# Patient Record
Sex: FEMALE | Race: WHITE | NOT HISPANIC OR LATINO | Employment: OTHER | URBAN - METROPOLITAN AREA
[De-identification: names, ages, dates, MRNs, and addresses within clinical notes are randomized per-mention and may not be internally consistent; named-entity substitution may affect disease eponyms.]

---

## 2017-03-28 DIAGNOSIS — Z12.31 ENCOUNTER FOR SCREENING MAMMOGRAM FOR MALIGNANT NEOPLASM OF BREAST: ICD-10-CM

## 2017-04-01 DIAGNOSIS — M54.50 LOW BACK PAIN: ICD-10-CM

## 2017-04-01 DIAGNOSIS — T45.2X1A: ICD-10-CM

## 2017-04-01 DIAGNOSIS — Z00.00 ENCOUNTER FOR GENERAL ADULT MEDICAL EXAMINATION WITHOUT ABNORMAL FINDINGS: ICD-10-CM

## 2017-04-19 ENCOUNTER — ALLSCRIPTS OFFICE VISIT (OUTPATIENT)
Dept: OTHER | Facility: OTHER | Age: 62
End: 2017-04-19

## 2017-05-02 ENCOUNTER — GENERIC CONVERSION - ENCOUNTER (OUTPATIENT)
Dept: OTHER | Facility: OTHER | Age: 62
End: 2017-05-02

## 2017-05-05 ENCOUNTER — GENERIC CONVERSION - ENCOUNTER (OUTPATIENT)
Dept: OTHER | Facility: OTHER | Age: 62
End: 2017-05-05

## 2017-05-05 LAB — ADDITIONAL INFORMATION (HISTORICAL): NORMAL

## 2017-05-11 ENCOUNTER — GENERIC CONVERSION - ENCOUNTER (OUTPATIENT)
Dept: OTHER | Facility: OTHER | Age: 62
End: 2017-05-11

## 2017-05-11 LAB — ADDITIONAL INFORMATION (HISTORICAL): NORMAL

## 2017-06-26 DIAGNOSIS — T45.2X1A: ICD-10-CM

## 2017-06-26 DIAGNOSIS — Z12.31 ENCOUNTER FOR SCREENING MAMMOGRAM FOR MALIGNANT NEOPLASM OF BREAST: ICD-10-CM

## 2017-06-26 DIAGNOSIS — R10.84 GENERALIZED ABDOMINAL PAIN: ICD-10-CM

## 2017-06-26 DIAGNOSIS — M54.50 LOW BACK PAIN: ICD-10-CM

## 2017-06-26 DIAGNOSIS — Z00.00 ENCOUNTER FOR GENERAL ADULT MEDICAL EXAMINATION WITHOUT ABNORMAL FINDINGS: ICD-10-CM

## 2017-07-24 ENCOUNTER — ALLSCRIPTS OFFICE VISIT (OUTPATIENT)
Dept: OTHER | Facility: OTHER | Age: 62
End: 2017-07-24

## 2017-08-18 ENCOUNTER — GENERIC CONVERSION - ENCOUNTER (OUTPATIENT)
Dept: OTHER | Facility: OTHER | Age: 62
End: 2017-08-18

## 2017-10-02 ENCOUNTER — GENERIC CONVERSION - ENCOUNTER (OUTPATIENT)
Dept: OTHER | Facility: OTHER | Age: 62
End: 2017-10-02

## 2018-01-11 NOTE — PROGRESS NOTES
Assessment    1  Former smoker (V15 82) (F29 525)   2  Vitamin D overdose (963 5,E980 4) (T45 2X1A)   3  Encounter for preventive health examination (V70 0) (Z00 00)    Plan  SocHx: Engages in travel abroad    · Transderm-Scop (1 5 MG) 1 MG/3DAYS Transdermal Patch 72 Hour  Vitamin D overdose    · (1) VITAMIN D 25-HYDROXY; Status:Active; Requested for:19Apr2017;     Discussion/Summary  health maintenance visit Currently, she eats a healthy diet and has an adequate exercise regimen  cervical cancer screening is managed by Gynecology Breast cancer screening: mammogram is current and Completes them at [facility]  Colorectal cancer screening: colorectal cancer screening is current and colorectal cancer screening is managed by Dr Brant Amaral was seen and examined in the office today  Her examination today was largely within normal limits  She is very active and typically runs about 5 miles a day  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  Her recent blood work reveals an excess of Vitamin D and she was told to stop her every other day Vit D supplement  She will repeat the level in about 3-4 months  The patient was counseled regarding instructions for management  Possible side effects of new medications were reviewed with the patient/guardian today  The treatment plan was reviewed with the patient/guardian  The patient/guardian understands and agrees with the treatment plan      Chief Complaint  Patient is here for yearly physical      History of Present Illness  HM, Adult Female: The patient is being seen for a health maintenance evaluation  Social History: Work status: working full time and occupation: Psychologist     [address]: She has regular dental visits  She denies vision problems  Lifestyle:  She consumes a diverse and healthy diet  She does not have any weight concerns  She exercises regularly  She does not use tobacco  She denies alcohol use     Screening: HPI: Dr Chelsea Goyal is here today for a routine physical  Chart was reviewed and updated  She also completed recent blood work which was reviewed as well  She reports generally feeling well and denies any significant complaints  She reports having a trip in [facility] in February and noted some nausea since  Her appetite has not changed  Denies abdominal pain or changes in stool  She otherwise denies any other related complaints  Review of Systems    Constitutional: No fever, no chills, feels well, no tiredness, no recent weight gain or weight loss  Eyes: No complaints of eye pain, no red eyes, no eyesight problems, no discharge, no dry eyes, no itching of eyes  ENT: no complaints of earache, no loss of hearing, no nose bleeds, no nasal discharge, no sore throat, no hoarseness  Cardiovascular: No complaints of slow heart rate, no fast heart rate, no chest pain, no palpitations, no leg claudication, no lower extremity edema  Respiratory: No complaints of shortness of breath, no wheezing, no cough, no SOB on exertion, no orthopnea, no PND  Gastrointestinal: nausea, but no abdominal pain, no vomiting, no constipation, no diarrhea and no blood in stools  Genitourinary: no dysuria, no pelvic pain and no unexplained vaginal bleeding  Musculoskeletal: No complaints of arthralgias, no myalgias, no joint swelling or stiffness, no limb pain or swelling  Integumentary: No complaints of skin rash or lesions, no itching, no skin wounds, no breast pain or lump  Neurological: no headache, no numbness, no tingling and no dizziness  Psychiatric: no anxiety, no sleep disturbances and no depression  Hematologic/Lymphatic: No complaints of swollen glands, no swollen glands in the neck, does not bleed easily, does not bruise easily  ROS reviewed  Active Problems    1  Engages in travel abroad (V49 89) (Z78 9)   2  Mechanical low back pain (724 2) (M54 5)   3   Visit for screening mammogram (V76 12) (Z12 31)    Past Medical History    · Encounter for screening colonoscopy (V76 51) (Z12 11)    Surgical History    · History of  Section    Family History  Mother    · Family history of malignant neoplasm of uterus (V16 49) (Z80 51)  Father    · Family history of pancreatic cancer (V16 0) (Z80 0)  Family History    · Denied: Family history of Drug abuse   · Denied: Family history of alcoholism    Social History    · Engages in travel abroad (V49 89) (Z78 9)   · Former smoker (V15 82) (G61 294)   · Denied: History of drug use   · Social alcohol use (Z78 9)    Current Meds   1  Transderm-Scop (1 5 MG) 1 MG/3DAYS Transdermal Patch 72 Hour; APPLY 1 PATCH   EVERY 3 DAYS; Therapy: [id number] to (Evaluate:2017)  Requested for: 14XMZ2645; Last   Rx:2017 Ordered    Allergies    1  No Known Drug Allergies    Vitals   Recorded: 2017 09:22AM   Heart Rate 59   Pulse Quality Normal   Respiration Quality Normal   Respiration 18   Systolic 670, LUE, Sitting   Diastolic 70, LUE, Sitting   Height 5 ft 7 5 in   Weight 153 lb 4 oz   BMI Calculated 23 65   BSA Calculated 1 82   O2 Saturation 99, RA     Physical Exam    Constitutional   General appearance: No acute distress, well appearing and well nourished  Head and Face   Head and face: Normal     Palpation of the face and sinuses: No sinus tenderness  Eyes   Conjunctiva and lids: No swelling, erythema or discharge  Pupils and irises: Equal, round, reactive to light  Ears, Nose, Mouth, and Throat   External inspection of ears and nose: Normal     Otoscopic examination: Abnormal   Cerumen present bilaterally  Oropharynx: Normal with no erythema, edema, exudate or lesions  Neck   Neck: Supple, symmetric, trachea midline, no masses  Thyroid: Normal, no thyromegaly  Pulmonary   Respiratory effort: No increased work of breathing or signs of respiratory distress  Auscultation of lungs: Clear to auscultation      Cardiovascular   Auscultation of heart: Normal rate and rhythm, normal S1 and S2, no murmurs  Pedal pulses: 2+ bilaterally  Examination of extremities for edema and/or varicosities: Normal     Abdomen   Abdomen: Non-tender, no masses  Liver and spleen: No hepatomegaly or splenomegaly  Lymphatic   Palpation of lymph nodes in neck: No lymphadenopathy  Musculoskeletal   Gait and station: Normal     Digits and nails: Normal without clubbing or cyanosis  Range of motion: Normal     Stability: Normal     Muscle strength/tone: Normal     Neurologic   Cortical function: Normal mental status  Sensation: No sensory loss  Psychiatric   Judgment and insight: Normal     Orientation to person, place, and time: Normal     Recent and remote memory: Intact  Mood and affect: Normal        Results/Data  PHQ-2 Adult Depression Screening 19Apr2017 09:50AM User, Ahs     Test Name Result Flag Reference   PHQ-2 Adult Depression Score 0     Over the last two weeks, how often have you been bothered by any of the following problems? Little interest or pleasure in doing things: Not at all - 0  Feeling down, depressed, or hopeless: Not at all - 0   PHQ-2 Adult Depression Screening Negative         Future Appointments    Date/Time Provider Specialty Site   04/23/2018 09:20 GILBERTO Galvez  Internal Medicine [facility]     Signatures   Electronically signed by : Candance Morelle, DO;  Apr 19 2017 10:04AM EST                       (Author)

## 2018-01-12 VITALS
SYSTOLIC BLOOD PRESSURE: 110 MMHG | BODY MASS INDEX: 22.9 KG/M2 | HEIGHT: 68 IN | WEIGHT: 151.13 LBS | HEART RATE: 76 BPM | OXYGEN SATURATION: 98 % | DIASTOLIC BLOOD PRESSURE: 78 MMHG

## 2018-01-13 VITALS
HEIGHT: 68 IN | SYSTOLIC BLOOD PRESSURE: 120 MMHG | WEIGHT: 153.25 LBS | BODY MASS INDEX: 23.22 KG/M2 | OXYGEN SATURATION: 99 % | DIASTOLIC BLOOD PRESSURE: 70 MMHG | RESPIRATION RATE: 18 BRPM | HEART RATE: 59 BPM

## 2018-04-19 ENCOUNTER — TELEPHONE (OUTPATIENT)
Dept: INTERNAL MEDICINE CLINIC | Facility: CLINIC | Age: 63
End: 2018-04-19

## 2018-04-19 DIAGNOSIS — Z00.00 BLOOD TESTS FOR ROUTINE GENERAL PHYSICAL EXAMINATION: Primary | ICD-10-CM

## 2018-04-23 ENCOUNTER — OFFICE VISIT (OUTPATIENT)
Dept: INTERNAL MEDICINE CLINIC | Facility: CLINIC | Age: 63
End: 2018-04-23
Payer: COMMERCIAL

## 2018-04-23 VITALS
HEART RATE: 65 BPM | BODY MASS INDEX: 22.44 KG/M2 | HEIGHT: 67 IN | OXYGEN SATURATION: 99 % | SYSTOLIC BLOOD PRESSURE: 116 MMHG | DIASTOLIC BLOOD PRESSURE: 78 MMHG | WEIGHT: 143 LBS

## 2018-04-23 DIAGNOSIS — Z13.820 ENCOUNTER FOR SCREENING FOR OSTEOPOROSIS: Primary | ICD-10-CM

## 2018-04-23 DIAGNOSIS — Z00.00 WELL ADULT EXAM: ICD-10-CM

## 2018-04-23 PROCEDURE — 99396 PREV VISIT EST AGE 40-64: CPT | Performed by: INTERNAL MEDICINE

## 2018-04-23 NOTE — PROGRESS NOTES
Assessment/Plan:    No problem-specific Assessment & Plan notes found for this encounter  Diagnoses and all orders for this visit:    Encounter for screening for osteoporosis  -     DXA bone density spine hip and pelvis; Future    Well adult exam    Other orders  -      Colonoscopy        Subjective:      Patient ID: Eboni Law is a 65 y o  female  HPI   Trevin Garnica is here today for periodic health appraisal   She feels well she is still engage in the practice of Psychology  She is an avid runner and runs approximately 5 miles every day often on her treadmill  She is up-to-date with gynecologist and mammography is not due for colonoscopy for couple years she had the Zostavax vaccine a couple of years ago and is aware of the new shingles X  she does not believe she has had a DEXA scan in the long time and would like to have 1 done  She was treated previously for cerumen impaction and perforated eardrum which is now satisfactorily resolved  She otherwise seems to be active and vigorous and has no complaints   The following portions of the patient's history were reviewed and updated as appropriate: allergies, current medications, past family history, past medical history, past social history, past surgical history and problem list     Review of Systems    Entirely noncontributory in all spheres    Objective:      /78   Pulse 65   Ht 5' 7" (1 702 m)   Wt 64 9 kg (143 lb)   SpO2 99%   BMI 22 40 kg/m²          Physical Exam    Trevin Garnica is a attractive happy and vivacious lady in no distress her pressure is 116/78 detailed examination of the head ears eyes nose and throat within normal limits the tympanic canals are free of debris and the and drums appear to be intact I do not see any scarring the neck veins are flat carotid pulses are normal trachea is midline thyroid unremarkable no masses palpated in the neck    Cardiac examination reveals an in palpable apical impulse S1 and S2 were normal without any adventitious sounds the rhythm is regular in physiologic in rate lungs are clear to percussion auscultation the breasts are without any masses or tenderness  The abdomen is soft and no palpable organ enlargement or masses the aorta is palpable the epigastrium and appears to have clinically and normal breath    Extremities are healthy there is a small scar on the inner aspect of the left leg from a previous resection of a birthmark deep tendon reflexes are active in symmetrical including ankle jerks sensory exam is normal as well as gait station and mentation Akshat Rivera appears to be in good health I did review her lab studies which was satisfactory will give her a note for DEXA scan will plan to see her in 1 year we discussed the merits of the additional shingles vaccine which she will consider

## 2018-10-02 ENCOUNTER — TELEPHONE (OUTPATIENT)
Dept: INTERNAL MEDICINE CLINIC | Facility: CLINIC | Age: 63
End: 2018-10-02

## 2018-10-02 DIAGNOSIS — Z12.31 SCREENING MAMMOGRAM, ENCOUNTER FOR: Primary | ICD-10-CM

## 2018-10-08 ENCOUNTER — OFFICE VISIT (OUTPATIENT)
Dept: INTERNAL MEDICINE CLINIC | Facility: CLINIC | Age: 63
End: 2018-10-08
Payer: COMMERCIAL

## 2018-10-08 VITALS
HEART RATE: 74 BPM | OXYGEN SATURATION: 98 % | WEIGHT: 154 LBS | TEMPERATURE: 99 F | BODY MASS INDEX: 24.17 KG/M2 | HEIGHT: 67 IN

## 2018-10-08 DIAGNOSIS — Z23 FLU VACCINE NEED: Primary | ICD-10-CM

## 2018-10-08 DIAGNOSIS — M85.80 OSTEOPENIA, UNSPECIFIED LOCATION: ICD-10-CM

## 2018-10-08 PROCEDURE — 90471 IMMUNIZATION ADMIN: CPT

## 2018-10-08 PROCEDURE — 1036F TOBACCO NON-USER: CPT | Performed by: INTERNAL MEDICINE

## 2018-10-08 PROCEDURE — 90682 RIV4 VACC RECOMBINANT DNA IM: CPT

## 2018-10-08 PROCEDURE — 99213 OFFICE O/P EST LOW 20 MIN: CPT | Performed by: INTERNAL MEDICINE

## 2018-10-08 NOTE — PROGRESS NOTES
Assessment/Plan:    No problem-specific Assessment & Plan notes found for this encounter  Diagnoses and all orders for this visit:    Flu vaccine need  -     influenza vaccine, [id number], quadrivalent, recombinant, PF, 0 5 mL, for patients 18 yr+ (FLUBLOK)    Osteopenia, unspecified location  -     Phosphorus; Future  -     Calcium, urine, 24 hour; Future  -     Protein electrophoresis, serum; Future  -     Protein electrophoresis, urine  -     Kappa/Lambda Light Chains Total, Random Urine  -     PTH, Intact and Calcium; Future        Subjective:      Patient ID: Berta Sharp is a 59 y o  female  HPI      Sarah Salguero is here to discuss a couple of issues with me she had some lab studies done recently that showed a cholesterol of 243 and HDL in the 80s and a ratio of 2 97 or so and questions whether not she should be on medication for her cholesterol  In addition she had a DEXA scan recently which showed a significant decrease in bone density since the previous study of 2011 she is an avid runner and also lifts weights and has an exceptionally healthy lifestyle  The following portions of the patient's history were reviewed and updated as appropriate: allergies, current medications, past family history, past medical history, past social history, past surgical history and problem list     Review of Systems  noncontributory    Objective:      Pulse 74   Temp 99 °F (37 2 °C) (Tympanic)   Ht 5' 7" (1 702 m)   Wt 69 9 kg (154 lb)   SpO2 98%   BMI 24 12 kg/m²          Physical Exam  patient was not physically examined we did bring the appropriate reports up on the epic screen went over them in detail at this point her lipids are satisfactory she has an extremely beneficial ratio of cholesterol to HDL and at this point she does not wish to take any medicines and I repeat the DEXA scan in another 12-18 months    Will go ahead also and do a serum calcium phosphorus protein electrophoresis intact PTH and 24 hr urine calcium ree that probably and any medication for lowering the cholesterol is not mandatory I apologize for what seems to be an irrational dictation as this dictating machine is extremely frustrating and puts dictated material at random in the wrong place we also gave her a note to read about the new shingles vaccine as she did have the original Zostavax 2 years ago

## 2018-10-18 ENCOUNTER — APPOINTMENT (OUTPATIENT)
Dept: LAB | Facility: HOSPITAL | Age: 63
End: 2018-10-18
Payer: COMMERCIAL

## 2018-10-18 DIAGNOSIS — M85.80 OSTEOPENIA, UNSPECIFIED LOCATION: ICD-10-CM

## 2018-10-18 DIAGNOSIS — Z00.00 BLOOD TESTS FOR ROUTINE GENERAL PHYSICAL EXAMINATION: ICD-10-CM

## 2018-10-18 LAB
25(OH)D3 SERPL-MCNC: 85.7 NG/ML (ref 30–100)
ALBUMIN SERPL BCP-MCNC: 3.6 G/DL (ref 3.5–5)
ALP SERPL-CCNC: 71 U/L (ref 46–116)
ALT SERPL W P-5'-P-CCNC: 22 U/L (ref 12–78)
ANION GAP SERPL CALCULATED.3IONS-SCNC: 3 MMOL/L (ref 4–13)
AST SERPL W P-5'-P-CCNC: 29 U/L (ref 5–45)
BACTERIA UR QL AUTO: ABNORMAL /HPF
BASOPHILS # BLD AUTO: 0.1 THOUSANDS/ΜL (ref 0–0.1)
BASOPHILS NFR BLD AUTO: 2 % (ref 0–1)
BILIRUB SERPL-MCNC: 0.37 MG/DL (ref 0.2–1)
BILIRUB UR QL STRIP: NEGATIVE
BUN SERPL-MCNC: 18 MG/DL (ref 5–25)
CALCIUM SERPL-MCNC: 9.4 MG/DL (ref 8.3–10.1)
CHLORIDE SERPL-SCNC: 105 MMOL/L (ref 100–108)
CHOLEST SERPL-MCNC: 242 MG/DL (ref 50–200)
CLARITY UR: CLEAR
CO2 SERPL-SCNC: 32 MMOL/L (ref 21–32)
COLOR UR: YELLOW
CREAT SERPL-MCNC: 0.77 MG/DL (ref 0.6–1.3)
EOSINOPHIL # BLD AUTO: 0.32 THOUSAND/ΜL (ref 0–0.61)
EOSINOPHIL NFR BLD AUTO: 6 % (ref 0–6)
ERYTHROCYTE [DISTWIDTH] IN BLOOD BY AUTOMATED COUNT: 12.6 % (ref 11.6–15.1)
GFR SERPL CREATININE-BSD FRML MDRD: 83 ML/MIN/1.73SQ M
GLUCOSE P FAST SERPL-MCNC: 92 MG/DL (ref 65–99)
GLUCOSE UR STRIP-MCNC: NEGATIVE MG/DL
HCT VFR BLD AUTO: 39.6 % (ref 34.8–46.1)
HDLC SERPL-MCNC: 89 MG/DL (ref 40–60)
HGB BLD-MCNC: 12.7 G/DL (ref 11.5–15.4)
HGB UR QL STRIP.AUTO: NEGATIVE
IMM GRANULOCYTES # BLD AUTO: 0.01 THOUSAND/UL (ref 0–0.2)
IMM GRANULOCYTES NFR BLD AUTO: 0 % (ref 0–2)
KETONES UR STRIP-MCNC: NEGATIVE MG/DL
LDLC SERPL CALC-MCNC: 144 MG/DL (ref 0–100)
LEUKOCYTE ESTERASE UR QL STRIP: ABNORMAL
LYMPHOCYTES # BLD AUTO: 1.67 THOUSANDS/ΜL (ref 0.6–4.47)
LYMPHOCYTES NFR BLD AUTO: 33 % (ref 14–44)
MCH RBC QN AUTO: 29.5 PG (ref 26.8–34.3)
MCHC RBC AUTO-ENTMCNC: 32.1 G/DL (ref 31.4–37.4)
MCV RBC AUTO: 92 FL (ref 82–98)
MONOCYTES # BLD AUTO: 0.46 THOUSAND/ΜL (ref 0.17–1.22)
MONOCYTES NFR BLD AUTO: 9 % (ref 4–12)
NEUTROPHILS # BLD AUTO: 2.44 THOUSANDS/ΜL (ref 1.85–7.62)
NEUTS SEG NFR BLD AUTO: 50 % (ref 43–75)
NITRITE UR QL STRIP: NEGATIVE
NON-SQ EPI CELLS URNS QL MICRO: ABNORMAL /HPF
NONHDLC SERPL-MCNC: 153 MG/DL
NRBC BLD AUTO-RTO: 0 /100 WBCS
PH UR STRIP.AUTO: 6.5 [PH] (ref 4.5–8)
PHOSPHATE SERPL-MCNC: 4.1 MG/DL (ref 2.3–4.1)
PLATELET # BLD AUTO: 247 THOUSANDS/UL (ref 149–390)
PMV BLD AUTO: 9.7 FL (ref 8.9–12.7)
POTASSIUM SERPL-SCNC: 4.7 MMOL/L (ref 3.5–5.3)
PROT SERPL-MCNC: 7.4 G/DL (ref 6.4–8.2)
PROT UR STRIP-MCNC: NEGATIVE MG/DL
PTH-INTACT SERPL-MCNC: 40.5 PG/ML (ref 18.4–80.1)
RBC # BLD AUTO: 4.3 MILLION/UL (ref 3.81–5.12)
RBC #/AREA URNS AUTO: ABNORMAL /HPF
SODIUM SERPL-SCNC: 140 MMOL/L (ref 136–145)
SP GR UR STRIP.AUTO: 1.01 (ref 1–1.03)
TRIGL SERPL-MCNC: 47 MG/DL
TSH SERPL DL<=0.05 MIU/L-ACNC: 2.41 UIU/ML (ref 0.36–3.74)
UROBILINOGEN UR QL STRIP.AUTO: 0.2 E.U./DL
WBC # BLD AUTO: 5 THOUSAND/UL (ref 4.31–10.16)
WBC #/AREA URNS AUTO: ABNORMAL /HPF

## 2018-10-18 PROCEDURE — 84165 PROTEIN E-PHORESIS SERUM: CPT

## 2018-10-18 PROCEDURE — 81001 URINALYSIS AUTO W/SCOPE: CPT

## 2018-10-18 PROCEDURE — 85025 COMPLETE CBC W/AUTO DIFF WBC: CPT

## 2018-10-18 PROCEDURE — 84166 PROTEIN E-PHORESIS/URINE/CSF: CPT | Performed by: PATHOLOGY

## 2018-10-18 PROCEDURE — 82306 VITAMIN D 25 HYDROXY: CPT

## 2018-10-18 PROCEDURE — 36415 COLL VENOUS BLD VENIPUNCTURE: CPT

## 2018-10-18 PROCEDURE — 84443 ASSAY THYROID STIM HORMONE: CPT

## 2018-10-18 PROCEDURE — 80061 LIPID PANEL: CPT

## 2018-10-18 PROCEDURE — 84165 PROTEIN E-PHORESIS SERUM: CPT | Performed by: PATHOLOGY

## 2018-10-18 PROCEDURE — 84100 ASSAY OF PHOSPHORUS: CPT

## 2018-10-18 PROCEDURE — 80053 COMPREHEN METABOLIC PANEL: CPT

## 2018-10-18 PROCEDURE — 84166 PROTEIN E-PHORESIS/URINE/CSF: CPT | Performed by: INTERNAL MEDICINE

## 2018-10-18 PROCEDURE — 87086 URINE CULTURE/COLONY COUNT: CPT

## 2018-10-18 PROCEDURE — 83970 ASSAY OF PARATHORMONE: CPT

## 2018-10-19 LAB
ALBUMIN SERPL ELPH-MCNC: 4.14 G/DL (ref 3.5–5)
ALBUMIN SERPL ELPH-MCNC: 60 % (ref 52–65)
ALBUMIN UR ELPH-MCNC: 100 %
ALPHA1 GLOB MFR UR ELPH: 0 %
ALPHA1 GLOB SERPL ELPH-MCNC: 0.31 G/DL (ref 0.1–0.4)
ALPHA1 GLOB SERPL ELPH-MCNC: 4.5 % (ref 2.5–5)
ALPHA2 GLOB MFR UR ELPH: 0 %
ALPHA2 GLOB SERPL ELPH-MCNC: 0.68 G/DL (ref 0.4–1.2)
ALPHA2 GLOB SERPL ELPH-MCNC: 9.9 % (ref 7–13)
B-GLOBULIN MFR UR ELPH: 0 %
BACTERIA UR CULT: NORMAL
BETA GLOB ABNORMAL SERPL ELPH-MCNC: 0.46 G/DL (ref 0.4–0.8)
BETA1 GLOB SERPL ELPH-MCNC: 6.7 % (ref 5–13)
BETA2 GLOB SERPL ELPH-MCNC: 4.6 % (ref 2–8)
BETA2+GAMMA GLOB SERPL ELPH-MCNC: 0.32 G/DL (ref 0.2–0.5)
GAMMA GLOB ABNORMAL SERPL ELPH-MCNC: 0.99 G/DL (ref 0.5–1.6)
GAMMA GLOB MFR UR ELPH: 0 %
GAMMA GLOB SERPL ELPH-MCNC: 14.3 % (ref 12–22)
IGG/ALB SER: 1.5 {RATIO} (ref 1.1–1.8)
PROT PATTERN SERPL ELPH-IMP: NORMAL
PROT PATTERN UR ELPH-IMP: NORMAL
PROT SERPL-MCNC: 6.9 G/DL (ref 6.4–8.2)
PROT UR-MCNC: 12 MG/DL

## 2018-10-25 ENCOUNTER — APPOINTMENT (OUTPATIENT)
Dept: LAB | Facility: HOSPITAL | Age: 63
End: 2018-10-25
Payer: COMMERCIAL

## 2018-10-25 DIAGNOSIS — M85.80 OSTEOPENIA, UNSPECIFIED LOCATION: ICD-10-CM

## 2018-10-25 PROCEDURE — 82340 ASSAY OF CALCIUM IN URINE: CPT

## 2018-10-25 PROCEDURE — 83883 ASSAY NEPHELOMETRY NOT SPEC: CPT

## 2018-10-26 LAB
CALCIUM 24H UR-MCNC: <145 MG/24 HRS (ref 42–353)
SPECIMEN VOL UR: 2900 ML

## 2018-10-27 ENCOUNTER — TELEPHONE (OUTPATIENT)
Dept: INTERNAL MEDICINE CLINIC | Facility: CLINIC | Age: 63
End: 2018-10-27

## 2018-10-27 LAB
KAPPA LC UR-MCNC: 8.38 MG/L (ref 1.35–24.19)
KAPPA LC/LAMBDA UR: 26.19 {RATIO} (ref 2.04–10.37)
LAMBDA LC UR-MCNC: 0.32 MG/L (ref 0.24–6.66)

## 2019-02-11 ENCOUNTER — TELEPHONE (OUTPATIENT)
Dept: INTERNAL MEDICINE CLINIC | Facility: CLINIC | Age: 64
End: 2019-02-11

## 2019-02-11 DIAGNOSIS — M85.80 OSTEOPENIA, UNSPECIFIED LOCATION: Primary | ICD-10-CM

## 2019-02-11 NOTE — TELEPHONE ENCOUNTER
Leatha Oleary is calling for a UA script  She said that she had a 24 hour urine done and now needs to follow up with another test   She is asking for it to be faxed to [phone] and confirmed that this is a secure fax line

## 2019-03-01 ENCOUNTER — TELEPHONE (OUTPATIENT)
Dept: INTERNAL MEDICINE CLINIC | Facility: CLINIC | Age: 64
End: 2019-03-01

## 2019-04-25 ENCOUNTER — TELEPHONE (OUTPATIENT)
Dept: INTERNAL MEDICINE CLINIC | Facility: CLINIC | Age: 64
End: 2019-04-25

## 2019-04-25 DIAGNOSIS — E78.00 ELEVATED CHOLESTEROL: Primary | ICD-10-CM

## 2019-04-29 ENCOUNTER — OFFICE VISIT (OUTPATIENT)
Dept: INTERNAL MEDICINE CLINIC | Facility: CLINIC | Age: 64
End: 2019-04-29
Payer: COMMERCIAL

## 2019-04-29 VITALS
BODY MASS INDEX: 24.31 KG/M2 | TEMPERATURE: 97.6 F | DIASTOLIC BLOOD PRESSURE: 80 MMHG | HEART RATE: 61 BPM | OXYGEN SATURATION: 98 % | SYSTOLIC BLOOD PRESSURE: 112 MMHG | WEIGHT: 155.2 LBS

## 2019-04-29 DIAGNOSIS — Z00.00 WELL ADULT EXAM: Primary | ICD-10-CM

## 2019-04-29 DIAGNOSIS — E78.00 ELEVATED CHOLESTEROL: ICD-10-CM

## 2019-04-29 DIAGNOSIS — Z23 NEED FOR SHINGLES VACCINE: ICD-10-CM

## 2019-04-29 DIAGNOSIS — M85.80 OSTEOPENIA, UNSPECIFIED LOCATION: ICD-10-CM

## 2019-04-29 DIAGNOSIS — Z00.00 BLOOD TESTS FOR ROUTINE GENERAL PHYSICAL EXAMINATION: ICD-10-CM

## 2019-04-29 PROCEDURE — 99396 PREV VISIT EST AGE 40-64: CPT | Performed by: INTERNAL MEDICINE

## 2019-04-29 PROCEDURE — 90750 HZV VACC RECOMBINANT IM: CPT

## 2019-04-29 PROCEDURE — 90471 IMMUNIZATION ADMIN: CPT

## 2019-07-15 ENCOUNTER — CLINICAL SUPPORT (OUTPATIENT)
Dept: INTERNAL MEDICINE CLINIC | Facility: CLINIC | Age: 64
End: 2019-07-15
Payer: COMMERCIAL

## 2019-07-15 DIAGNOSIS — Z23 NEED FOR SHINGLES VACCINE: Primary | ICD-10-CM

## 2019-07-15 PROCEDURE — 90471 IMMUNIZATION ADMIN: CPT

## 2019-07-15 PROCEDURE — 90750 HZV VACC RECOMBINANT IM: CPT

## 2019-11-18 DIAGNOSIS — Z12.31 BREAST CANCER SCREENING BY MAMMOGRAM: Primary | ICD-10-CM

## 2019-11-22 DIAGNOSIS — Z12.31 SCREENING MAMMOGRAM, ENCOUNTER FOR: ICD-10-CM

## 2019-11-25 ENCOUNTER — TELEPHONE (OUTPATIENT)
Dept: INTERNAL MEDICINE CLINIC | Facility: CLINIC | Age: 64
End: 2019-11-25

## 2020-02-07 DIAGNOSIS — Z13.29 SCREENING FOR THYROID DISORDER: ICD-10-CM

## 2020-02-07 DIAGNOSIS — Z13.21 ENCOUNTER FOR VITAMIN DEFICIENCY SCREENING: ICD-10-CM

## 2020-02-07 DIAGNOSIS — E78.00 ELEVATED CHOLESTEROL: Primary | ICD-10-CM

## 2020-02-07 DIAGNOSIS — M85.80 OSTEOPENIA, UNSPECIFIED LOCATION: ICD-10-CM

## 2020-03-25 ENCOUNTER — TELEPHONE (OUTPATIENT)
Dept: INTERNAL MEDICINE CLINIC | Facility: CLINIC | Age: 65
End: 2020-03-25

## 2020-03-25 NOTE — TELEPHONE ENCOUNTER
----- Message from Florence Ann MD sent at 3/16/2020 12:07 PM EDT -----  Labs okay vitamin-D on the high side    Check vitamin-D dose let me know and will probably reduce it

## 2020-11-13 ENCOUNTER — TELEPHONE (OUTPATIENT)
Dept: INTERNAL MEDICINE CLINIC | Facility: CLINIC | Age: 65
End: 2020-11-13

## 2021-01-05 ENCOUNTER — TELEMEDICINE (OUTPATIENT)
Dept: INTERNAL MEDICINE CLINIC | Facility: CLINIC | Age: 66
End: 2021-01-05
Payer: COMMERCIAL

## 2021-01-05 DIAGNOSIS — M85.859 OSTEOPENIA OF HIP, UNSPECIFIED LATERALITY: Primary | ICD-10-CM

## 2021-01-05 PROCEDURE — 99213 OFFICE O/P EST LOW 20 MIN: CPT | Performed by: INTERNAL MEDICINE

## 2021-01-05 NOTE — PROGRESS NOTES
Virtual Regular Visit      Assessment/Plan:    Problem List Items Addressed This Visit     None               Reason for visit is   Chief Complaint   Patient presents with    Virtual Regular Visit        Encounter provider Randi Schmitt MD    Provider located at [facility] [address]      Recent Visits  No visits were found meeting these conditions  Showing recent visits within past 7 days and meeting all other requirements     Today's Visits  Date Type Provider Dept   01/05/21 Telemedicine Randi Schmitt MD  Internal Med [facility]   Showing today's visits and meeting all other requirements     Future Appointments  No visits were found meeting these conditions  Showing future appointments within next 150 days and meeting all other requirements        The patient was identified by name and date of birth  Kassi Nguyen was informed that this is a telemedicine visit and that the visit is being conducted through [employer] and patient was informed that this is not a secure, HIPAA-compliant platform  She agrees to proceed     My office door was closed  No one else was in the room  She acknowledged consent and understanding of privacy and security of the video platform  The patient has agreed to participate and understands they can discontinue the visit at any time  Patient is aware this is a billable service  Subjective  Kassi Nguyen is a 60 y o  female          HPI    Murray Bermudez  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    Because of the [employer] pandemic we had a virtual visit rather than in-person visit she continues to be actively engaged in the practice of Psychology she has a couple of issues that she wanted to discuss with me she has been under the care of Dr Taryn Garsia  Her auto laryngologist and is aware that she has some hearing loss that was borderline there seems to be a strong family history additionally of rlso noticed some thinning of her hair she has been under the care of Dr Marty Marcelo her dermatologist and suggested that she discuss this with her she had a DEXA scan 2 and half years ago that showed some shown some bone loss she has had a protein electrophoresis PTH level and 24 hour urine calcium which basically showed low levels of excreted calcium she has been using a calcium supplement this stupid computer just excreted half of my dictation so I had to repeat it  At this point I am going to suggest that the we go ahead and repeat a DEXA scan and also refreshed a protein electrophoresis 24 hour urine calcium PTH level and ferritin she is going to get a COVID shot tomorrow interestingly enough in [place] which I certainly have endorsed we had a very nice conversation in visit  Past Surgical History:   Procedure Laterality Date     SECTION      x4   a  s  Current Outpatient Medications   Medication Sig Dispense Refill    Calcium-Vitamin D-Vitamin K 500-1000-40 MG-UNT-MCG CHEW 1 chew po daily      estradiol (ESTRACE) 0 1 mg/g vaginal cream Use 1 gm nightly for two weeks then twice weekly for maintenance       No current facility-administered medications for this visit  No Known Allergies    Review of Systems    Video Exam    There were no vitals filed for this visit  Physical Exam     I spent 20 minutes directly with the patient during this visit      [address] acknowledges that she has consented to an online visit or consultation  She understands that the online visit is based solely on information provided by her, and that, in the absence of a face-to-face physical evaluation by the physician, the diagnosis she receives is both limited and provisional in terms of accuracy and completeness  This is not intended to replace a full medical face-to-face evaluation by the physician   Christianne Santos understands and accepts these terms

## 2021-01-07 PROBLEM — T45.2X1A VITAMIN D OVERDOSE: Status: ACTIVE | Noted: 2017-04-19

## 2021-01-23 DIAGNOSIS — Z23 ENCOUNTER FOR IMMUNIZATION: ICD-10-CM

## 2021-07-08 DIAGNOSIS — Z00.00 BLOOD TESTS FOR ROUTINE GENERAL PHYSICAL EXAMINATION: Primary | ICD-10-CM

## 2021-07-13 ENCOUNTER — OFFICE VISIT (OUTPATIENT)
Dept: INTERNAL MEDICINE CLINIC | Facility: CLINIC | Age: 66
End: 2021-07-13
Payer: COMMERCIAL

## 2021-07-13 VITALS
BODY MASS INDEX: 23.73 KG/M2 | WEIGHT: 151.2 LBS | TEMPERATURE: 97.8 F | OXYGEN SATURATION: 98 % | DIASTOLIC BLOOD PRESSURE: 65 MMHG | HEIGHT: 67 IN | SYSTOLIC BLOOD PRESSURE: 111 MMHG | HEART RATE: 67 BPM

## 2021-07-13 DIAGNOSIS — Z11.59 ENCOUNTER FOR HEPATITIS C SCREENING TEST FOR LOW RISK PATIENT: ICD-10-CM

## 2021-07-13 DIAGNOSIS — Z13.29 SCREENING FOR THYROID DISORDER: ICD-10-CM

## 2021-07-13 DIAGNOSIS — Z23 NEED FOR 23-POLYVALENT PNEUMOCOCCAL POLYSACCHARIDE VACCINE: ICD-10-CM

## 2021-07-13 DIAGNOSIS — M85.80 OSTEOPENIA, UNSPECIFIED LOCATION: ICD-10-CM

## 2021-07-13 DIAGNOSIS — Z11.4 SCREENING FOR HIV WITHOUT PRESENCE OF RISK FACTORS: ICD-10-CM

## 2021-07-13 DIAGNOSIS — E78.00 ELEVATED CHOLESTEROL: ICD-10-CM

## 2021-07-13 DIAGNOSIS — T45.2X1S VITAMIN D OVERDOSE, ACCIDENTAL OR UNINTENTIONAL, SEQUELA: Primary | ICD-10-CM

## 2021-07-13 PROCEDURE — 3008F BODY MASS INDEX DOCD: CPT | Performed by: INTERNAL MEDICINE

## 2021-07-13 PROCEDURE — 90471 IMMUNIZATION ADMIN: CPT | Performed by: INTERNAL MEDICINE

## 2021-07-13 PROCEDURE — 90732 PPSV23 VACC 2 YRS+ SUBQ/IM: CPT | Performed by: INTERNAL MEDICINE

## 2021-07-13 PROCEDURE — 1036F TOBACCO NON-USER: CPT | Performed by: INTERNAL MEDICINE

## 2021-07-13 PROCEDURE — 99214 OFFICE O/P EST MOD 30 MIN: CPT | Performed by: INTERNAL MEDICINE

## 2021-07-13 PROCEDURE — 3725F SCREEN DEPRESSION PERFORMED: CPT | Performed by: INTERNAL MEDICINE

## 2021-07-13 NOTE — PROGRESS NOTES
Assessment/Plan:     Diagnoses and all orders for this visit:    Vitamin D overdose, accidental or unintentional, sequela  -     Vitamin D 25 hydroxy; Future    Osteopenia, unspecified location  -     Comprehensive metabolic panel; Future  -     UA (URINE) with reflex to Scope    Elevated cholesterol  -     Lipid panel; Future    Screening for thyroid disorder  -     TSH, 3rd generation; Future    Encounter for hepatitis C screening test for low risk patient  -     Hepatitis C antibody; Future    Screening for HIV without presence of risk factors  -     Rapid HIV 1/2 AB-AG Combo; Future    Other orders  -     CBC and differential; Future          Subjective:      Patient ID: Brian Garay is a 60 y o  female  Katarzyna Counter  is a carlos manuel lady who is here for visit she continues to practice psychology and has been generally and robust  Health    She is up-to-date with mammography colonoscopy gyn she did have a DEXA scan recently that did show osteoporosis she has been an avid bicyclist and works out now with the [employer] she has no alcohol or tobacco and generally feels quite well    The following portions of the patient's history were reviewed and updated as appropriate: allergies, current medications, past family history, past medical history, past social history, past surgical history and problem list     Review of Systems   noncontributory    Objective:      /65 (BP Location: Left arm, Patient Position: Sitting, Cuff Size: Standard)   Pulse 67   Temp 97 8 °F (36 6 °C) (Skin)   Ht 5' 7" (1 702 m)   Wt 68 6 kg (151 lb 3 2 oz)   SpO2 98%   BMI 23 68 kg/m²     BP Readings from Last 3 Encounters:   07/13/21 111/65   09/16/19 114/80   04/29/19 112/80      Wt Readings from Last 3 Encounters:   07/13/21 68 6 kg (151 lb 3 2 oz)   04/16/21 68 kg (150 lb)   09/16/19 70 3 kg (155 lb)      BMI: Estimated body mass index is 23 68 kg/m² as calculated from the following:    Height as of this encounter: 5' 7" (1 702 m)  Weight as of this encounter: 68 6 kg (151 lb 3 2 oz)  BSA: Estimated body surface area is 1 8 meters squared as calculated from the following:    Height as of this encounter: 5' 7" (1 702 m)  Weight as of this encounter: 68 6 kg (151 lb 3 2 oz)  Physical Exam   She is a healthy appearing vivacious lady she is in no distress her blood pressure is 120/78 the lungs are clear the carotids are quiet cardiac examination reveals regular rhythm physiologic rate no murmurs or gallops in the abdomen is soft without palpable organ enlargement or masses extremities are healthy skin is warm and dry there is no edema peripheral pulses are palpable patient is quite stable the osteoporosis certainly is of concern we will refer her to endocrinology for evaluation and treatment will give her a Pneumovax today  She has previously had PTH levels TSH protein electrophoresis done and so forth and a 24 hour urine calcium which may be helpful to the endocrinologist      Current Outpatient Medications:     Calcium-Vitamin D-Vitamin K 500-1000-40 MG-UNT-MCG CHEW, 1 chew po daily, Disp: , Rfl:     estradiol (ESTRACE) 0 1 mg/g vaginal cream, Use 1 gm nightly for two weeks then twice weekly for maintenance, Disp: , Rfl:     This note was completed in part utilizing [employer] Software  Grammatical errors, random word insertions, spelling mistakes, and incomplete sentences can be an occasional consequence of this system secondary to software limitations, ambient noise, and hardware issues  If you have any question or concerns about the content, text, or information contained within the body of this dictation, please contact the provider for clarification

## 2021-07-14 ENCOUNTER — TELEPHONE (OUTPATIENT)
Dept: INTERNAL MEDICINE CLINIC | Facility: CLINIC | Age: 66
End: 2021-07-14

## 2021-07-14 NOTE — TELEPHONE ENCOUNTER
Referral is in the system, left message, advised pt to call the office if she has any questions Speech Therapy    Chart reviewed and spoke with RN. Attempted to see patient for swallow evaluation. She refused all PO, despite numerous attempts. She reports pain, but refuses to indicate where it hurts or how bad the pain is. When asked if she needs anything, she says \"NO! Laissez-emili tranquille! \" (leave me alone). She would speak in English at times, otherwise in [place]. When asked to speak [place], she would state, \"No, I don't care! Dessole pour vous. \" (sorry for you) and (try harder!) When told that my [place] is a bit fuzzy. Unable to complete swallow evaluation at this time. Will f/u later as patient participation allows. ADDIS RamirezS., CCC-SLP

## 2021-07-14 NOTE — TELEPHONE ENCOUNTER
Patient contacted Dr Lisa Anthony office to set up an appt and ufortunately he is not taking any new patients  Suggestions?

## 2021-09-30 ENCOUNTER — CONSULT (OUTPATIENT)
Dept: ENDOCRINOLOGY | Facility: HOSPITAL | Age: 66
End: 2021-09-30
Payer: MEDICARE

## 2021-09-30 VITALS
SYSTOLIC BLOOD PRESSURE: 136 MMHG | HEART RATE: 50 BPM | BODY MASS INDEX: 23.92 KG/M2 | DIASTOLIC BLOOD PRESSURE: 78 MMHG | WEIGHT: 152.4 LBS | HEIGHT: 67 IN

## 2021-09-30 DIAGNOSIS — M81.0 AGE-RELATED OSTEOPOROSIS WITHOUT CURRENT PATHOLOGICAL FRACTURE: Primary | ICD-10-CM

## 2021-09-30 DIAGNOSIS — M85.80 OSTEOPENIA, UNSPECIFIED LOCATION: ICD-10-CM

## 2021-09-30 PROCEDURE — 99204 OFFICE O/P NEW MOD 45 MIN: CPT | Performed by: INTERNAL MEDICINE

## 2021-09-30 RX ORDER — MELATONIN
1000 DAILY
COMMUNITY

## 2021-09-30 NOTE — PATIENT INSTRUCTIONS
The dexa scan is  Osteoporosis  Yes it is reasonable to use reclast yearly  Follow up with Your endo

## 2021-09-30 NOTE — PROGRESS NOTES
9/30/2021    Assessment/Plan      Diagnoses and all orders for this visit:    Age-related osteoporosis without current pathological fracture    Osteopenia, unspecified location  -     Ambulatory referral to Endocrinology    Other orders  -     cholecalciferol (VITAMIN D3) 1,000 units tablet; Take 1,000 Units by mouth daily prn  -     Calcium Carbonate (CALCIUM 600 PO); Take by mouth 2 (two) times a day        Assessment/Plan:     Osteoporosis  She is here for 2nd opinion  It appears she has osteoporosis of L3 and L4 which likely she had in 2018 as her actual bone mineral density was pretty much the same but I think there is of falls and decrease in lumbar spine bone mineral density standard deviation because the more recent DEXA scan measured only L3-L4 and the previous 1 at measured L1-L4  That being said, based on her osteopenia of the hip and forearm along with osteoporosis in the lumbar spine, she should be treating her osteoporosis to prevent complications  I discussed with her multiple options of treatment including bisphosphonates orally and IV, subcutaneous Prolia, subcutaneous Forteo  Based on her level of osteoporosis, it is quite reasonable to start with bisphosphonate therapy and she is more interested in IV therapy  I did reassure her that osteonecrosis of the jaw is quite rare and more commonly occurs in patients with carcinoma being treated with chemotherapy although is not out of the possibility in a normal person  I would not be as upset about utilizing bisphosphonates as her dentist's is as I am quite reassured regarding that  She is going to go through with her IV Reclast which is scheduled for next month  She will be following up with her endocrinologist in the [facility]  I have not scheduled her for follow-up at this time        CC:   Osteoporosis consult    History of Present Illness     HPI: Anna Smith is a 73y o  year old female with history of Osteoporosis and worsening DEXA scan here for evaluation / consult for 2nd opinion  She reports that she had a DEXA scan about 3 years ago which demonstrated osteopenia and a recent DEXA scan several months ago seem to imply osteoporosis but the actual numbers of the bone mineral density did not change, just the standard deviation  She had seen [facility] who have recommended treatment with IV Reclast but when she told her dentist her dentist was very upset about it worried about osteonecrosis of the jaw so she came here for 2nd opinion regarding the osteoporosis  Of note, 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 who are physicians to have been giving their input and she has investigated her options  She is concerned about not treating osteoporosis so does want to treated as she has had a fractured ankle when she fell on a hike and when she and her  coli did on the bike, she had a fractured wrist but her  did not so she thinks her bones are a bit weaker even though she has not had a traumatic fractures  She has never been on steroids or PPIs  She has never had oral estrogen replacement therapy and went through menopause at the age of 48  She has been utilizing vaginal estrogen therapy  She has lost about half an inch in height  The previous endocrinologist did rule out any other secondary causes of osteoporosis including hyperparathyroidism, vitamin-D deficiency, and multiple myeloma  She reports that she is quite active as she is an avid runner and cyclist   She does not think that there is any family history of osteoporosis but her mother was quite thin  She does utilize calcium 600 mg twice a day and vitamin-D 1000 units several times a week  She denies polyuria or polydipsia  She has once a night nocturia  She has no history of renal stones  She denies back pain  She denies fatigue    She denies perioral or extremity paresthesias or muscle weakness  She denies tremors  She denies constipation  Review of Systems   Constitutional: Negative for fatigue and unexpected weight change  HENT: Positive for hearing loss  Negative for tinnitus and trouble swallowing  Some decrease in hearing  Eyes: Negative for visual disturbance  Uses monovision contacts  Respiratory: Negative for chest tightness and shortness of breath  Cardiovascular: Negative for chest pain, palpitations and leg swelling  Gastrointestinal: Negative for abdominal pain, constipation, diarrhea and nausea  Endocrine: Negative for cold intolerance, heat intolerance, polydipsia and polyuria  Nocturia once a night  Musculoskeletal: Negative for arthralgias and back pain  Skin: Negative for rash  Neurological: Negative for dizziness, tremors, weakness, light-headedness, numbness and headaches  Psychiatric/Behavioral: Negative for confusion, dysphoric mood and sleep disturbance  The patient is not nervous/anxious  Historical Information   No past medical history on file    Past Surgical History:   Procedure Laterality Date     SECTION      x4     Social History   Social History     Substance and Sexual Activity   Alcohol Use Yes    Comment: social     Social History     Substance and Sexual Activity   Drug Use No     Social History     Tobacco Use   Smoking Status Former Smoker   Smokeless Tobacco Never Used     Family History:   Family History   Problem Relation Age of Onset    Uterine cancer Mother     Thyroid disease unspecified Mother     Pancreatic cancer Father     No Known Problems Brother     Lung disease Son         CF mutation    No Known Problems Son     No Known Problems Daughter        Meds/Allergies   Current Outpatient Medications   Medication Sig Dispense Refill    Calcium Carbonate (CALCIUM 600 PO) Take by mouth 2 (two) times a day      Calcium-Vitamin D-Vitamin K 500-1000-40 MG-UNT-MCG CHEW 1 chew po daily  cholecalciferol (VITAMIN D3) 1,000 units tablet Take 1,000 Units by mouth daily prn      estradiol (ESTRACE) 0 1 mg/g vaginal cream Use 1 gm nightly for two weeks then twice weekly for maintenance       No current facility-administered medications for this visit  No Known Allergies    Objective   Vitals: Blood pressure 136/78, pulse (!) 50, height 5' 7" (1 702 m), weight 69 1 kg (152 lb 6 4 oz)  Invasive Devices     None                 Physical Exam  Vitals reviewed  Constitutional:       Appearance: Normal appearance  She is well-developed and normal weight  HENT:      Head: Normocephalic and atraumatic  Eyes:      Extraocular Movements: Extraocular movements intact  Conjunctiva/sclera: Conjunctivae normal       Comments:   No lid lag, stare, proptosis, or periorbital edema  Neck:      Thyroid: No thyromegaly  Vascular: No carotid bruit  Comments:   Thyroid normal in size  No palpable thyroid nodules  No bruits over the thyroid gland  No supraclavicular fat pad or buffalo hump  Cardiovascular:      Rate and Rhythm: Normal rate and regular rhythm  Heart sounds: Normal heart sounds  No murmur heard  Pulmonary:      Effort: Pulmonary effort is normal       Breath sounds: Normal breath sounds  No wheezing  Abdominal:      General: Bowel sounds are normal       Palpations: Abdomen is soft  Tenderness: There is no abdominal tenderness  Musculoskeletal:         General: No deformity  Normal range of motion  Cervical back: Normal range of motion and neck supple  Right lower leg: No edema  Left lower leg: No edema  Comments:   No tremor of the outstretched hands  No spinous process tenderness  No CVA tenderness  Lymphadenopathy:      Cervical: No cervical adenopathy  Skin:     General: Skin is warm and dry  Findings: No rash  Neurological:      Mental Status: She is alert and oriented to person, place, and time        Deep Tendon Reflexes: Reflexes are normal and symmetric  Comments:   Deep tendon reflexes normal          The history was obtained from the review of the chart and from the patient  Lab Results:      Blood work done on 08/06/2021 showed a BMP with a calcium of 9 2 but was otherwise normal   Parathyroid hormone levels 53  3  Twenty-five hydroxy vitamin-D level is 81  Serum protein electrophoresis is negative  24 hour urine for C Telopeptide was 646  Lab Results   Component Value Date    CREATININE 0 77 10/18/2018    BUN 18 10/18/2018    K 4 7 10/18/2018     10/18/2018    CO2 32 10/18/2018     eGFR   Date Value Ref Range Status   10/18/2018 83 ml/min/1 73sq m Final       Lab Results   Component Value Date    HDL 89 (H) 10/18/2018    TRIG 47 10/18/2018       Lab Results   Component Value Date    ALT 22 10/18/2018    AST 29 10/18/2018    ALKPHOS 71 10/18/2018      DEXA scan:     DEXA scan done on 05/07/2018 showed a bone density of 0 79 gram/centimeter squared from lumbar spine 1 through 4 which demonstrates a T-score of -2 3 was 13% less than 2011, left hip bone density 0 773 gram/centimeters squared with a T-score of -1 4 with a femoral neck density of 0 628 grams/centimeters squared with a T-score of 2 which was 7% less than 2011 and right forearm density of 0 651 with a standard deviation of -0 7  A DXA bone mineral density examination Performed at [facility] was performed with a Hologic scanner  The   patient is a 81-year-old postmenopausal female  Comparison is made to a previous   study of 5/7/2018  The lumbar spine was examined from L3-4  In total, the bone mineral density is 3   standard deviations below the predicted peak bone mass and is 70% of normal  The   lumbar spine measurement is 0 776 g/cm2  This measurement has not changed   compared to the previous study  The left hip was examined in several regions   In total, the bone mineral density   is 1 2 standard deviations below the predicted peak bone mass and is 84% of   normal   The total hip measurement is 0 791 g/cm2  This measurement has   increased 2 3% compared to the previous study which is not statistically   significant        The femoral neck measurement is 1 8 standard deviations below the predicted peak   bone mass and is 76% of normal  The femoral neck measurement is 0 644 g/cm2  This measurement has increased 2 6% compared to the previous study which is not   statistically significant  The left forearm was examined   The one-third radius has bone mineral density   that is 1 5 standard deviations below the predicted peak bone mass and is 87% of   normal  This measurement has decreased 10 1% compared to the previous study   which is statistically significant  Impression:   The examination is reviewed using the World Health Organization criteria  There is osteoporosis as measured by the bone mineral density of the lumbar   spine  1  The lumbar spine has bone mineral density of osteoporosis with measurements   that show high risk for fracture      2  The hip has bone mineral density of osteopenia with measurements that show   increased risk for fracture  3  The forearm has bone mineral density of osteopenia with measurements that   show increased risk for fracture      4  A FRAX is not reported because some T-score is at or below -2  5 `          Future Appointments   Date Time Provider Mirima Byrne   10/18/2021  9:45 AM Kristin Schofield MD [facility]   1/18/2022  9:00 AM Boaz Mccabe MD [facility]

## 2022-01-21 ENCOUNTER — OFFICE VISIT (OUTPATIENT)
Dept: INTERNAL MEDICINE CLINIC | Facility: CLINIC | Age: 67
End: 2022-01-21
Payer: MEDICARE

## 2022-01-21 VITALS
SYSTOLIC BLOOD PRESSURE: 112 MMHG | HEIGHT: 67 IN | OXYGEN SATURATION: 98 % | TEMPERATURE: 97.5 F | WEIGHT: 154.6 LBS | HEART RATE: 76 BPM | RESPIRATION RATE: 18 BRPM | DIASTOLIC BLOOD PRESSURE: 66 MMHG | BODY MASS INDEX: 24.27 KG/M2

## 2022-01-21 DIAGNOSIS — E55.9 VITAMIN D DEFICIENCY: ICD-10-CM

## 2022-01-21 DIAGNOSIS — Z00.00 MEDICARE ANNUAL WELLNESS VISIT, INITIAL: ICD-10-CM

## 2022-01-21 DIAGNOSIS — Z00.00 MEDICARE ANNUAL WELLNESS VISIT, SUBSEQUENT: ICD-10-CM

## 2022-01-21 DIAGNOSIS — M81.0 AGE-RELATED OSTEOPOROSIS WITHOUT CURRENT PATHOLOGICAL FRACTURE: Primary | ICD-10-CM

## 2022-01-21 PROCEDURE — G0402 INITIAL PREVENTIVE EXAM: HCPCS | Performed by: INTERNAL MEDICINE

## 2022-01-21 PROCEDURE — 1123F ACP DISCUSS/DSCN MKR DOCD: CPT | Performed by: INTERNAL MEDICINE

## 2022-01-21 PROCEDURE — 99214 OFFICE O/P EST MOD 30 MIN: CPT | Performed by: INTERNAL MEDICINE

## 2022-01-21 NOTE — PROGRESS NOTES
INTERNAL MEDICINE OFFICE VISIT  [facility] Internal [facility]- [place]    NAME: Vlad Lyles  AGE: 73 y o  SEX: female    DATE OF ENCOUNTER: 1/21/2022    Assessment and Plan     1  Age-related osteoporosis without current pathological fracture  -she is established with endocrinology  She received her 1st Reclast infusion back in November  -she exercises daily  -continue with calcium, vitamin-D supplementation, weight-bearing exercise as tolerated    3  Medicare annual wellness visit, initial  - CBC and differential; Future  - Comprehensive metabolic panel; Future  - Lipid panel; Future  - TSH, 3rd generation with Free T4 reflex; Future    4  Vitamin D deficiency  - Vitamin D 25 hydroxy; Future     Colonoscopy was completed in June of 2013  She is due for follow-up next June    She has had abnormal Pap smears in the past and is continuing to undergo screening    Up-to-date on mammogram   Completed December 2021, BI-RADS 1-              Orders Placed This Encounter   Procedures    CBC and differential    Comprehensive metabolic panel    Lipid panel    Vitamin D 25 hydroxy    TSH, 3rd generation with Free T4 reflex       Chief Complaint     Chief Complaint   Patient presents with    Follow-up     6 month / hep ,HIV, tdap    Medicare Wellness Visit       History of Present Illness     Teddy Johnson comes in today for follow-up and AWV visit  Medical history of osteoporosis    She is originally from [place]  Met her  who is from the [facility] and relocated here  She has 3 living adult children  Her oldest daughter unfortunately passed away from Chromo  Her oldest son has a cystic fibrosis mutation and has some manifestations of the disease  She is a former smoker - smoked for 7 years quite heavily but thankfully quit more than 40 years ago  She is a social wine drinker    Family history of pancreatic cancer in her father, endometrial cancer runs on both sides of the family    She had genetic testing done and was found to have mutations of indeterminate significance but no mutations consistent with hereditary cancer syndromes      The following portions of the patient's history were reviewed and updated as appropriate: allergies, current medications, past family history, past medical history, past social history, past surgical history and problem list     Review of Systems     10 point ROS negative except per HPI    Active Problem List     Patient Active Problem List   Diagnosis    Well adult exam    Vitamin D overdose    Mechanical low back pain    Age-related osteoporosis without current pathological fracture       Objective     /66 (BP Location: Left arm, Patient Position: Sitting, Cuff Size: Standard)   Pulse 76   Temp 97 5 °F (36 4 °C)   Resp 18   Ht 5' 7" (1 702 m)   Wt 70 1 kg (154 lb 9 6 oz)   SpO2 98%   BMI 24 21 kg/m²     Physical Exam  Constitutional:       Appearance: Normal appearance  She is not ill-appearing  HENT:      Head: Normocephalic and atraumatic  Eyes:      General: No scleral icterus  Right eye: No discharge  Left eye: No discharge  Cardiovascular:      Rate and Rhythm: Normal rate and regular rhythm  Heart sounds: No murmur heard  No friction rub  Pulmonary:      Effort: Pulmonary effort is normal       Breath sounds: Normal breath sounds  No wheezing or rales  Abdominal:      General: Abdomen is flat  There is no distension  Palpations: Abdomen is soft  Tenderness: There is no abdominal tenderness  Musculoskeletal:         General: No swelling or tenderness  Skin:     General: Skin is warm and dry  Findings: No erythema  Neurological:      Mental Status: She is alert  Mental status is at baseline  Motor: No weakness  Psychiatric:         Mood and Affect: Mood normal          Behavior: Behavior normal          Pertinent Laboratory/Diagnostic Studies:  Patient was never admitted      Images and diagnostics reviewed     Current Medications     Current Outpatient Medications:     Calcium Carbonate (CALCIUM 600 PO), Take by mouth 2 (two) times a day, Disp: , Rfl:     Calcium-Vitamin D-Vitamin K 500-1000-40 MG-UNT-MCG CHEW, 1 chew po daily, Disp: , Rfl:     cholecalciferol (VITAMIN D3) 1,000 units tablet, Take 1,000 Units by mouth daily prn, Disp: , Rfl:     estradiol (VAGIFEM, YUVAFEM) 10 MCG TABS vaginal tablet, Insert 1 tab intravaginally twice weekly  , Disp: , Rfl:     Multiple Vitamin (MULTIVITAMINS PO), Take by mouth, Disp: , Rfl:     estradiol (ESTRACE) 0 1 mg/g vaginal cream, Use 1 gm nightly for two weeks then twice weekly for maintenance, Disp: , Rfl:     Health Maintenance     Health Maintenance   Topic Date Due    Hepatitis C Screening  Never done    HIV Screening  Never done    DTaP,Tdap,and Td Vaccines (1 - Tdap) Never done    COVID-19 Vaccine (3 - Booster for Moderna series) 08/04/2021    Influenza Vaccine (1) 09/01/2021    Fall Risk  01/21/2023    Depression Screening  01/21/2023    Medicare Annual Wellness Visit (AWV)  01/21/2023    BMI: Adult  01/21/2023    Colorectal Cancer Screening  06/21/2023    Breast Cancer Screening: Mammogram  12/06/2023    Pneumococcal Vaccine: 65+ Years (1 of 1 - PPSV23) 07/13/2026    Osteoporosis Screening  Completed    HIB Vaccine  Aged Out    Hepatitis B Vaccine  Aged Out    IPV Vaccine  Aged Out    Hepatitis A Vaccine  Aged Out    Meningococcal ACWY Vaccine  Aged Out    HPV Vaccine  Aged Out     Immunization History   Administered Date(s) Administered    COVID-19 MODERNA VACC 0 5 ML IM 01/05/2021, 02/04/2021    INFLUENZA 01/11/2013, 10/23/2013, 10/07/2015, 10/25/2015, 12/01/2016, 10/21/2017, 12/01/2017    Influenza Quadrivalent Preservative Free 3 years and older IM 12/01/2016    Influenza, recombinant, quadrivalent,injectable, preservative free 10/08/2018    Influenza, seasonal, injectable 10/07/2015    Influenza, seasonal, injectable, preservative free 10/21/2017, 12/01/2017    Pneumococcal Polysaccharide PPV23 07/13/2021    Typhoid Live, oral 08/25/2017    Typhoid, Unspecified 08/25/2017    Zoster Vaccine Recombinant 04/29/2019, 07/15/2019       ALMITA Gallardo  Internal Medicine [facility]  [address], [facility] SPECIALTY [facility] [address]  [place], [address]  Office: [phone]

## 2022-01-21 NOTE — PROGRESS NOTES
Assessment and Plan:     Problem List Items Addressed This Visit        Musculoskeletal and Integument    Age-related osteoporosis without current pathological fracture - Primary      Other Visit Diagnoses     Medicare annual wellness visit, subsequent        Medicare annual wellness visit, initial        Relevant Orders    CBC and differential    Comprehensive metabolic panel    Lipid panel    TSH, 3rd generation with Free T4 reflex    Vitamin D deficiency        Relevant Orders    Vitamin D 25 hydroxy           Preventive health issues were discussed with patient, and age appropriate screening tests were ordered as noted in patient's After Visit Summary  Personalized health advice and appropriate referrals for health education or preventive services given if needed, as noted in patient's After Visit Summary  History of Present Illness:     Patient presents for Medicare Annual Wellness visit    Patient Care Team:  Nolan Menendez DO as PCP - General (Internal Medicine)  Sheila Cisneros MD as PCP - [facility]CP-[facility] Ashley Hallman MD as PCP - Endocrinology (Endocrinology)     Problem List:     Patient Active Problem List   Diagnosis    Well adult exam    Vitamin D overdose    Mechanical low back pain    Age-related osteoporosis without current pathological fracture      Past Medical and Surgical History:     No past medical history on file    Past Surgical History:   Procedure Laterality Date     SECTION      x4      Family History:     Family History   Problem Relation Age of Onset    Uterine cancer Mother     Thyroid disease unspecified Mother     Pancreatic cancer Father     No Known Problems Brother     Lung disease Son         CF mutation    No Known Problems Son     No Known Problems Daughter       Social History:     Social History     Socioeconomic History    Marital status: /Civil Union     Spouse name: None    Number of children: None    Years of education: None    Highest education level: None   Occupational History    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 [employer]   Tobacco Use    Smoking status: Former Smoker     Years: 40 00    Smokeless tobacco: Never Used   Vaping Use    Vaping Use: Never used   Substance and Sexual Activity    Alcohol use: Yes     Comment: social    Drug use: No    Sexual activity: None   Other Topics Concern    None   Social History Narrative    Engages in travel abroad     Social Determinants of Health     Financial Resource Strain: Not on file   Food Insecurity: Not on file   Transportation Needs: Not on file   Physical Activity: Not on file   Stress: Not on file   Social Connections: Not on file   Intimate Partner Violence: Not on file   Housing Stability: Not on file      Medications and Allergies:     Current Outpatient Medications   Medication Sig Dispense Refill    Calcium Carbonate (CALCIUM 600 PO) Take by mouth 2 (two) times a day      Calcium-Vitamin D-Vitamin K 500-1000-40 MG-UNT-MCG CHEW 1 chew po daily      cholecalciferol (VITAMIN D3) 1,000 units tablet Take 1,000 Units by mouth daily prn      estradiol (VAGIFEM, YUVAFEM) 10 MCG TABS vaginal tablet Insert 1 tab intravaginally twice weekly   Multiple Vitamin (MULTIVITAMINS PO) Take by mouth      estradiol (ESTRACE) 0 1 mg/g vaginal cream Use 1 gm nightly for two weeks then twice weekly for maintenance       No current facility-administered medications for this visit       No Known Allergies   Immunizations:     Immunization History   Administered Date(s) Administered    COVID-19 MODERNA VACC 0 5 ML IM 01/05/2021, 02/04/2021    INFLUENZA 01/11/2013, 10/23/2013, 10/07/2015, 10/25/2015, 12/01/2016, 10/21/2017, 12/01/2017    Influenza Quadrivalent Preservative Free 3 years and older IM 12/01/2016    Influenza, recombinant, quadrivalent,injectable, preservative free 10/08/2018    Influenza, seasonal, injectable 10/07/2015    Influenza, seasonal, injectable, preservative free 10/21/2017, 12/01/2017    Pneumococcal Polysaccharide PPV23 07/13/2021    Typhoid Live, oral 08/25/2017    Typhoid, Unspecified 08/25/2017    Zoster Vaccine Recombinant 04/29/2019, 07/15/2019      Health Maintenance:         Topic Date Due    Hepatitis C Screening  Never done    HIV Screening  Never done    Colorectal Cancer Screening  06/21/2023    Breast Cancer Screening: Mammogram  12/06/2023         Topic Date Due    DTaP,Tdap,and Td Vaccines (1 - Tdap) Never done    COVID-19 Vaccine (3 - Booster for Moderna series) 08/04/2021    Influenza Vaccine (1) 09/01/2021      Medicare Health Risk Assessment:     /66 (BP Location: Left arm, Patient Position: Sitting, Cuff Size: Standard)   Pulse 76   Temp 97 5 °F (36 4 °C)   Resp 18   Ht 5' 7" (1 702 m)   Wt 70 1 kg (154 lb 9 6 oz)   SpO2 98%   BMI 24 21 kg/m²      Broderick Gilmore is here for her Subsequent Wellness visit  Health Risk Assessment:   Patient rates overall health as excellent  Patient feels that their physical health rating is same  Patient is very satisfied with their life  Eyesight was rated as same  Hearing was rated as slightly worse  Patient feels that their emotional and mental health rating is same  Patients states they are sometimes angry  Patient states they are sometimes unusually tired/fatigued  Pain experienced in the last 7 days has been none  Patient states that she has experienced no weight loss or gain in last 6 months  Depression Screening:   PHQ-2 Score: 0      Nutrition:   Current diet is Regular and Limited junk food  Medications:   Patient is currently taking over-the-counter supplements  OTC medications include: see medication list  Patient is able to manage medications       Activities of Daily Living (ADLs)/Instrumental Activities of Daily Living (IADLs):   Walk and transfer into and out of bed and chair?: Yes  Dress and groom yourself?: Yes    Bathe or shower yourself?: Yes    Feed yourself? Yes  Do your laundry/housekeeping?: Yes  Manage your money, pay your bills and track your expenses?: Yes  Make your own meals?: Yes    Do your own shopping?: Yes    Previous Hospitalizations:   Any hospitalizations or ED visits within the last 12 months?: No      Advance Care Planning:   Living will: Yes    Durable POA for healthcare: Yes    Advanced directive: Yes      PREVENTIVE SCREENINGS      Cardiovascular Screening:    General: Screening Not Indicated and History Lipid Disorder      Colorectal Cancer Screening:     General: Screening Current      Breast Cancer Screening:     General: Screening Current      Cervical Cancer Screening:    General: Screening Not Indicated      Osteoporosis Screening:    General: Screening Not Indicated and History Osteoporosis      Lung Cancer Screening:     General: Screening Not Indicated    Screening, Brief Intervention, and Referral to Treatment (SBIRT)    Screening  Typical number of drinks in a day: 2  Typical number of drinks in a week: 12  Interpretation: Low risk drinking behavior      Single Item Drug Screening:  How often have you used an illegal drug (including marijuana) or a prescription medication for non-medical reasons in the past year? never    Single Item Drug Screen Score: 0  Interpretation: Negative screen for possible drug use disorder      Nolan Middleton, DO

## 2022-01-21 NOTE — PATIENT INSTRUCTIONS
Medicare Preventive Visit Patient Instructions  Thank you for completing your Welcome to Medicare Visit or Medicare Annual Wellness Visit today  Your next wellness visit will be due in one year (1/22/2023)  The screening/preventive services that you may require over the next 5-10 years are detailed below  Some tests may not apply to you based off risk factors and/or age  Screening tests ordered at today's visit but not completed yet may show as past due  Also, please note that scanned in results may not display below  Preventive Screenings:  Service Recommendations Previous Testing/Comments   Colorectal Cancer Screening  * Colonoscopy    * Fecal Occult Blood Test (FOBT)/Fecal Immunochemical Test (FIT)  * Fecal DNA/Cologuard Test  * Flexible Sigmoidoscopy Age: 54-65 years old   Colonoscopy: every 10 years (may be performed more frequently if at higher risk)  OR  FOBT/FIT: every 1 year  OR  Cologuard: every 3 years  OR  Sigmoidoscopy: every 5 years  Screening may be recommended earlier than age 48 if at higher risk for colorectal cancer  Also, an individualized decision between you and your healthcare provider will decide whether screening between the ages of 74-80 would be appropriate  Colonoscopy: 06/21/2013  FOBT/FIT: Not on file  Cologuard: Not on file  Sigmoidoscopy: Not on file    Screening Current     Breast Cancer Screening Age: 36 years old  Frequency: every 1-2 years  Not required if history of left and right mastectomy Mammogram: 12/06/2021    Screening Current   Cervical Cancer Screening Between the ages of 21-29, pap smear recommended once every 3 years  Between the ages of 33-67, can perform pap smear with HPV co-testing every 5 years     Recommendations may differ for women with a history of total hysterectomy, cervical cancer, or abnormal pap smears in past  Pap Smear: Not on file    Screening Not Indicated   Hepatitis C Screening Once for adults born between 1945 and 1965  More frequently in patients at high risk for Hepatitis C Hep C Antibody: Not on file        Diabetes Screening 1-2 times per year if you're at risk for diabetes or have pre-diabetes Fasting glucose: 92 mg/dL   A1C: No results in last 5 years        Cholesterol Screening Once every 5 years if you don't have a lipid disorder  May order more often based on risk factors  Lipid panel: 11/10/2020    Screening Not Indicated  History Lipid Disorder     Other Preventive Screenings Covered by Medicare:  1  Abdominal Aortic Aneurysm (AAA) Screening: covered once if your at risk  You're considered to be at risk if you have a family history of AAA  2  Lung Cancer Screening: covers low dose CT scan once per year if you meet all of the following conditions: (1) Age 50-69; (2) No signs or symptoms of lung cancer; (3) Current smoker or have quit smoking within the last 15 years; (4) You have a tobacco smoking history of at least 30 pack years (packs per day multiplied by number of years you smoked); (5) You get a written order from a healthcare provider  3  Glaucoma Screening: covered annually if you're considered high risk: (1) You have diabetes OR (2) Family history of glaucoma OR (3)  aged 48 and older OR (3)  American aged 72 and older  3  Osteoporosis Screening: covered every 2 years if you meet one of the following conditions: (1) You're estrogen deficient and at risk for osteoporosis based off medical history and other findings; (2) Have a vertebral abnormality; (3) On glucocorticoid therapy for more than 3 months; (4) Have primary hyperparathyroidism; (5) On osteoporosis medications and need to assess response to drug therapy  · Last bone density test (DXA Scan): 05/07/2018  5  HIV Screening: covered annually if you're between the age of 12-76  Also covered annually if you are younger than 13 and older than 72 with risk factors for HIV infection   For pregnant patients, it is covered up to 3 times per pregnancy  Immunizations:  Immunization Recommendations   Influenza Vaccine Annual influenza vaccination during flu season is recommended for all persons aged >= 6 months who do not have contraindications   Pneumococcal Vaccine (Prevnar and Pneumovax)  * Prevnar = PCV13  * Pneumovax = PPSV23   Adults 25-60 years old: 1-3 doses may be recommended based on certain risk factors  Adults 72 years old: Prevnar (PCV13) vaccine recommended followed by Pneumovax (PPSV23) vaccine  If already received PPSV23 since turning 65, then PCV13 recommended at least one year after PPSV23 dose  Hepatitis B Vaccine 3 dose series if at intermediate or high risk (ex: diabetes, end stage renal disease, liver disease)   Tetanus (Td) Vaccine - COST NOT COVERED BY MEDICARE PART B Following completion of primary series, a booster dose should be given every 10 years to maintain immunity against tetanus  Td may also be given as tetanus wound prophylaxis  Tdap Vaccine - COST NOT COVERED BY MEDICARE PART B Recommended at least once for all adults  For pregnant patients, recommended with each pregnancy  Shingles Vaccine (Shingrix) - COST NOT COVERED BY MEDICARE PART B  2 shot series recommended in those aged 48 and above     Health Maintenance Due:      Topic Date Due    Hepatitis C Screening  Never done    HIV Screening  Never done    Colorectal Cancer Screening  06/21/2023    Breast Cancer Screening: Mammogram  12/06/2023     Immunizations Due:      Topic Date Due    DTaP,Tdap,and Td Vaccines (1 - Tdap) Never done    COVID-19 Vaccine (3 - Booster for Moderna series) 08/04/2021    Influenza Vaccine (1) 09/01/2021     Advance Directives   What are advance directives? Advance directives are legal documents that state your wishes and plans for medical care  These plans are made ahead of time in case you lose your ability to make decisions for yourself   Advance directives can apply to any medical decision, such as the treatments you want, and if you want to donate organs  What are the types of advance directives? There are many types of advance directives, and each state has rules about how to use them  You may choose a combination of any of the following:  · Living will: This is a written record of the treatment you want  You can also choose which treatments you do not want, which to limit, and which to stop at a certain time  This includes surgery, medicine, IV fluid, and tube feedings  · Durable power of  for healthcare [facility]): This is a written record that states who you want to make healthcare choices for you when you are unable to make them for yourself  This person, called a proxy, is usually a family member or a friend  You may choose more than 1 proxy  · Do not resuscitate (DNR) order:  A DNR order is used in case your heart stops beating or you stop breathing  It is a request not to have certain forms of treatment, such as CPR  A DNR order may be included in other types of advance directives  · Medical directive: This covers the care that you want if you are in a coma, near death, or unable to make decisions for yourself  You can list the treatments you want for each condition  Treatment may include pain medicine, surgery, blood transfusions, dialysis, IV or tube feedings, and a ventilator (breathing machine)  · Values history: This document has questions about your views, beliefs, and how you feel and think about life  This information can help others choose the care that you would choose  Why are advance directives important? An advance directive helps you control your care  Although spoken wishes may be used, it is better to have your wishes written down  Spoken wishes can be misunderstood, or not followed  Treatments may be given even if you do not want them  An advance directive may make it easier for your family to make difficult choices about your care        © Copyright [employer] 2018 Information is for End User's use only and may not be sold, redistributed or otherwise used for commercial purposes   All illustrations and images included in CareNotes® are the copyrighted property of A D A M , Inc  or Jasiel Rodriguez

## 2022-03-07 DIAGNOSIS — Z71.84 COUNSELING FOR TRAVEL: Primary | ICD-10-CM

## 2022-03-11 ENCOUNTER — APPOINTMENT (OUTPATIENT)
Dept: LAB | Facility: HOSPITAL | Age: 67
End: 2022-03-11
Attending: INTERNAL MEDICINE
Payer: MEDICARE

## 2022-03-11 ENCOUNTER — CLINICAL SUPPORT (OUTPATIENT)
Dept: INTERNAL MEDICINE CLINIC | Facility: CLINIC | Age: 67
End: 2022-03-11
Payer: MEDICARE

## 2022-03-11 DIAGNOSIS — Z23 NEED FOR PROPHYLACTIC VACCINATION WITH COMBINED DIPHTHERIA-TETANUS-PERTUSSIS (DTP) VACCINE: ICD-10-CM

## 2022-03-11 DIAGNOSIS — Z23 NEED FOR TDAP VACCINATION: Primary | ICD-10-CM

## 2022-03-11 DIAGNOSIS — Z71.84 COUNSELING FOR TRAVEL: ICD-10-CM

## 2022-03-11 DIAGNOSIS — Z00.00 MEDICARE ANNUAL WELLNESS VISIT, INITIAL: ICD-10-CM

## 2022-03-11 DIAGNOSIS — E55.9 VITAMIN D DEFICIENCY: ICD-10-CM

## 2022-03-11 LAB
25(OH)D3 SERPL-MCNC: 72.1 NG/ML (ref 30–100)
ALBUMIN SERPL BCP-MCNC: 3.6 G/DL (ref 3.5–5)
ALP SERPL-CCNC: 46 U/L (ref 46–116)
ALT SERPL W P-5'-P-CCNC: 23 U/L (ref 12–78)
ANION GAP SERPL CALCULATED.3IONS-SCNC: 6 MMOL/L (ref 4–13)
AST SERPL W P-5'-P-CCNC: 26 U/L (ref 5–45)
BASOPHILS # BLD AUTO: 0.07 THOUSANDS/ΜL (ref 0–0.1)
BASOPHILS NFR BLD AUTO: 2 % (ref 0–1)
BILIRUB SERPL-MCNC: 0.6 MG/DL (ref 0.2–1)
BUN SERPL-MCNC: 14 MG/DL (ref 5–25)
CALCIUM SERPL-MCNC: 8.8 MG/DL (ref 8.3–10.1)
CHLORIDE SERPL-SCNC: 103 MMOL/L (ref 100–108)
CHOLEST SERPL-MCNC: 252 MG/DL
CO2 SERPL-SCNC: 31 MMOL/L (ref 21–32)
CREAT SERPL-MCNC: 0.73 MG/DL (ref 0.6–1.3)
EOSINOPHIL # BLD AUTO: 0.26 THOUSAND/ΜL (ref 0–0.61)
EOSINOPHIL NFR BLD AUTO: 6 % (ref 0–6)
ERYTHROCYTE [DISTWIDTH] IN BLOOD BY AUTOMATED COUNT: 13.8 % (ref 11.6–15.1)
GFR SERPL CREATININE-BSD FRML MDRD: 86 ML/MIN/1.73SQ M
GLUCOSE P FAST SERPL-MCNC: 84 MG/DL (ref 65–99)
HCT VFR BLD AUTO: 40.3 % (ref 34.8–46.1)
HDLC SERPL-MCNC: 81 MG/DL
HGB BLD-MCNC: 13.6 G/DL (ref 11.5–15.4)
IMM GRANULOCYTES # BLD AUTO: 0.01 THOUSAND/UL (ref 0–0.2)
IMM GRANULOCYTES NFR BLD AUTO: 0 % (ref 0–2)
LDLC SERPL CALC-MCNC: 160 MG/DL (ref 0–100)
LYMPHOCYTES # BLD AUTO: 1.54 THOUSANDS/ΜL (ref 0.6–4.47)
LYMPHOCYTES NFR BLD AUTO: 34 % (ref 14–44)
MCH RBC QN AUTO: 31.1 PG (ref 26.8–34.3)
MCHC RBC AUTO-ENTMCNC: 33.7 G/DL (ref 31.4–37.4)
MCV RBC AUTO: 92 FL (ref 82–98)
MONOCYTES # BLD AUTO: 0.43 THOUSAND/ΜL (ref 0.17–1.22)
MONOCYTES NFR BLD AUTO: 9 % (ref 4–12)
NEUTROPHILS # BLD AUTO: 2.26 THOUSANDS/ΜL (ref 1.85–7.62)
NEUTS SEG NFR BLD AUTO: 49 % (ref 43–75)
NONHDLC SERPL-MCNC: 171 MG/DL
NRBC BLD AUTO-RTO: 0 /100 WBCS
PLATELET # BLD AUTO: 242 THOUSANDS/UL (ref 149–390)
PMV BLD AUTO: 9.8 FL (ref 8.9–12.7)
POTASSIUM SERPL-SCNC: 4.6 MMOL/L (ref 3.5–5.3)
PROT SERPL-MCNC: 7.3 G/DL (ref 6.4–8.2)
RBC # BLD AUTO: 4.38 MILLION/UL (ref 3.81–5.12)
SODIUM SERPL-SCNC: 140 MMOL/L (ref 136–145)
TRIGL SERPL-MCNC: 56 MG/DL
TSH SERPL DL<=0.05 MIU/L-ACNC: 2.22 UIU/ML (ref 0.36–3.74)
WBC # BLD AUTO: 4.57 THOUSAND/UL (ref 4.31–10.16)

## 2022-03-11 PROCEDURE — 90715 TDAP VACCINE 7 YRS/> IM: CPT

## 2022-03-11 PROCEDURE — 36415 COLL VENOUS BLD VENIPUNCTURE: CPT

## 2022-03-11 PROCEDURE — 80061 LIPID PANEL: CPT

## 2022-03-11 PROCEDURE — 86704 HEP B CORE ANTIBODY TOTAL: CPT

## 2022-03-11 PROCEDURE — 90471 IMMUNIZATION ADMIN: CPT

## 2022-03-11 PROCEDURE — 86708 HEPATITIS A ANTIBODY: CPT

## 2022-03-11 PROCEDURE — 85025 COMPLETE CBC W/AUTO DIFF WBC: CPT

## 2022-03-11 PROCEDURE — 86709 HEPATITIS A IGM ANTIBODY: CPT

## 2022-03-11 PROCEDURE — 86706 HEP B SURFACE ANTIBODY: CPT

## 2022-03-11 PROCEDURE — 84443 ASSAY THYROID STIM HORMONE: CPT

## 2022-03-11 PROCEDURE — 82306 VITAMIN D 25 HYDROXY: CPT

## 2022-03-11 PROCEDURE — 80053 COMPREHEN METABOLIC PANEL: CPT

## 2022-03-12 LAB
HAV AB SER QL IA: REACTIVE
HAV IGM SER QL: NORMAL
HBV CORE AB SER QL: NORMAL
HBV SURFACE AB SER-ACNC: <3.1 MIU/ML

## 2022-03-16 ENCOUNTER — TELEPHONE (OUTPATIENT)
Dept: INTERNAL MEDICINE CLINIC | Facility: CLINIC | Age: 67
End: 2022-03-16

## 2022-03-16 DIAGNOSIS — Z71.84 TRAVEL ADVICE ENCOUNTER: Primary | ICD-10-CM

## 2022-03-21 DIAGNOSIS — Z78.9 PATIENT TRAVELS: Primary | ICD-10-CM

## 2022-03-21 NOTE — TELEPHONE ENCOUNTER
Patient leaving for [place] a week from Sunday    Had Titers done for Hep B  Checked with [facility] and they stated that she does need the Hep B vaccine prior to the trip

## 2022-03-24 DIAGNOSIS — Z78.9 PATIENT TRAVELS: Primary | ICD-10-CM

## 2022-09-11 ENCOUNTER — PATIENT MESSAGE (OUTPATIENT)
Dept: INTERNAL MEDICINE CLINIC | Facility: CLINIC | Age: 67
End: 2022-09-11

## 2022-09-11 DIAGNOSIS — N83.9 LESION OF OVARY: ICD-10-CM

## 2022-09-11 DIAGNOSIS — R19.04 LEFT LOWER QUADRANT ABDOMINAL SWELLING, MASS AND LUMP: ICD-10-CM

## 2022-09-11 DIAGNOSIS — K76.0 FATTY LIVER: Primary | ICD-10-CM

## 2022-09-22 NOTE — TELEPHONE ENCOUNTER
From: Yaquelin Russo  To: Nolan Metz Snowball, DO  Sent: 9/11/2022 11:10 AM EDT  Subject: Recent ultrasounds    Dana Hwang:  Last Tuesday, 9/6/22, I had three ultrasounds: abdominal, pelvic, and transvaginal  I trust that you can access these studies through [facility]? Of the three, I am most concerned about the abdominal ultrasound which lists "Prominent liver with probable partial fat replacement (especially near the gallbladder fossa)"  (There is also note of a "1 9 cm sonolucent/cystic lesion of left ovary" reported on another study ) I am hopeful that you can give me some guidance as to what these findings mean, as well as appropriate follow-up studies, etc  Thank you

## 2022-10-02 ENCOUNTER — PATIENT MESSAGE (OUTPATIENT)
Dept: INTERNAL MEDICINE CLINIC | Facility: CLINIC | Age: 67
End: 2022-10-02

## 2022-10-02 DIAGNOSIS — K76.0 FATTY LIVER: Primary | ICD-10-CM

## 2022-10-03 NOTE — TELEPHONE ENCOUNTER
From: Taty Gill  To: Nolan Hartley Chentefarheen  Sent: 10/2/2022 6:01 PM EDT  Subject: Test results    SHAHNAZ Francisco, Thank you for your message and for your analysis  My gynecologist informs me that she is going to be referring me to another individual for follow-up on the left ovarian cyst, even though my CA-125 was low  Rosa Watkins My question for you is this: How does one interpret the existence of a "fatty liver" along with normal liver enzymes? I have stopped drinking alcohol entirely during the last month, and wonder if that will have any effect on my "fatty" liver? I am also wondering if I could have a repeat liver scan prior to Thanksgiving, when I am scheduled to leave for [place] for several months  Could you order that test, please?  Thanks again, [employer]

## 2022-11-14 ENCOUNTER — HOSPITAL ENCOUNTER (OUTPATIENT)
Dept: ULTRASOUND IMAGING | Facility: HOSPITAL | Age: 67
Discharge: HOME | End: 2022-11-14
Attending: INTERNAL MEDICINE

## 2022-11-14 DIAGNOSIS — K76.0 FATTY LIVER: ICD-10-CM

## 2023-04-02 ENCOUNTER — PATIENT MESSAGE (OUTPATIENT)
Dept: INTERNAL MEDICINE CLINIC | Facility: CLINIC | Age: 68
End: 2023-04-02

## 2023-04-02 DIAGNOSIS — K76.0 FATTY LIVER: ICD-10-CM

## 2023-04-02 DIAGNOSIS — Z13.29 SCREENING FOR THYROID DISORDER: ICD-10-CM

## 2023-04-02 DIAGNOSIS — R79.9 ABNORMAL FINDING OF BLOOD CHEMISTRY, UNSPECIFIED: ICD-10-CM

## 2023-04-02 DIAGNOSIS — Z13.220 SCREENING FOR LIPID DISORDERS: ICD-10-CM

## 2023-04-02 DIAGNOSIS — E55.9 VITAMIN D DEFICIENCY: Primary | ICD-10-CM

## 2023-04-03 NOTE — TELEPHONE ENCOUNTER
From: Raul Dixon  To: Nolan Rojo  Sent: 4/2/2023 10:00 PM EDT  Subject: Pre-appointment lab tests    Dana Riddle Mention,  My  Barbara Ludwig and I are scheduled for our Medicare visits with you on Friday, April 14  I see there is an order in [address] for a basic hepatic panel and basic metabolic panel for me - is there any other blood work I should have before 4/14? (I'd like to do it all at once ) Also, Barbara Ludwig would need a prescription for bloodwork as well - he is asking for A1C and a lipid panel (do I need that? ? ) If you could put prescriptions in our charts we'll get the bloodwork before 4/14    Thanks, [employer]

## 2023-04-09 LAB — HBA1C MFR BLD HPLC: 5.2 %

## 2023-04-14 PROBLEM — N83.202 LEFT OVARIAN CYST: Status: ACTIVE | Noted: 2023-04-14

## 2023-04-14 PROBLEM — E78.00 ELEVATED LDL CHOLESTEROL LEVEL: Status: ACTIVE | Noted: 2023-04-14

## 2023-12-28 ENCOUNTER — PATIENT MESSAGE (OUTPATIENT)
Dept: INTERNAL MEDICINE CLINIC | Facility: CLINIC | Age: 68
End: 2023-12-28

## 2023-12-28 DIAGNOSIS — Z82.49 FAMILY HISTORY OF AORTIC ANEURYSM: Primary | ICD-10-CM

## 2023-12-28 DIAGNOSIS — E78.5 DYSLIPIDEMIA: ICD-10-CM

## 2023-12-28 DIAGNOSIS — Z82.49 FAMILY HISTORY OF EARLY CAD: ICD-10-CM

## 2024-02-21 PROBLEM — Z00.00 WELL ADULT EXAM: Status: RESOLVED | Noted: 2018-04-23 | Resolved: 2024-02-21

## 2024-04-04 ENCOUNTER — PATIENT MESSAGE (OUTPATIENT)
Dept: INTERNAL MEDICINE CLINIC | Facility: CLINIC | Age: 69
End: 2024-04-04

## 2024-04-04 ENCOUNTER — HOSPITAL ENCOUNTER (OUTPATIENT)
Dept: CT IMAGING | Facility: HOSPITAL | Age: 69
Discharge: HOME | End: 2024-04-04
Attending: INTERNAL MEDICINE
Payer: COMMERCIAL

## 2024-04-04 ENCOUNTER — HOSPITAL ENCOUNTER (OUTPATIENT)
Dept: ULTRASOUND IMAGING | Facility: HOSPITAL | Age: 69
Discharge: HOME | End: 2024-04-04
Attending: INTERNAL MEDICINE
Payer: MEDICARE

## 2024-04-04 DIAGNOSIS — M81.0 AGE-RELATED OSTEOPOROSIS WITHOUT CURRENT PATHOLOGICAL FRACTURE: Primary | ICD-10-CM

## 2024-04-04 DIAGNOSIS — E78.5 DYSLIPIDEMIA: ICD-10-CM

## 2024-04-04 DIAGNOSIS — Z82.49 FAMILY HISTORY OF AORTIC ANEURYSM: ICD-10-CM

## 2024-04-04 DIAGNOSIS — E55.9 VITAMIN D DEFICIENCY: ICD-10-CM

## 2024-04-04 DIAGNOSIS — N83.209 CYST OF OVARY, UNSPECIFIED LATERALITY: ICD-10-CM

## 2024-04-04 DIAGNOSIS — R79.9 ABNORMAL FINDING OF BLOOD CHEMISTRY, UNSPECIFIED: ICD-10-CM

## 2024-04-04 DIAGNOSIS — D39.9 NEOPLASM OF UNCERTAIN BEHAVIOR OF FEMALE GENITAL ORGAN, UNSPECIFIED: ICD-10-CM

## 2024-04-04 DIAGNOSIS — Z82.49 FAMILY HISTORY OF EARLY CAD: ICD-10-CM

## 2024-04-04 PROCEDURE — G1004 CDSM NDSC: HCPCS

## 2024-04-04 PROCEDURE — 75571 CT HRT W/O DYE W/CA TEST: CPT

## 2024-04-04 PROCEDURE — 76775 US EXAM ABDO BACK WALL LIM: CPT

## 2024-04-12 ENCOUNTER — RA CDI HCC (OUTPATIENT)
Dept: OTHER | Facility: HOSPITAL | Age: 69
End: 2024-04-12

## 2024-04-15 ENCOUNTER — APPOINTMENT (OUTPATIENT)
Dept: LAB | Facility: HOSPITAL | Age: 69
End: 2024-04-15
Payer: MEDICARE

## 2024-04-15 DIAGNOSIS — N83.209 CYST OF OVARY, UNSPECIFIED LATERALITY: ICD-10-CM

## 2024-04-15 DIAGNOSIS — D39.9 NEOPLASM OF UNCERTAIN BEHAVIOR OF FEMALE GENITAL ORGAN, UNSPECIFIED: ICD-10-CM

## 2024-04-15 DIAGNOSIS — E55.9 VITAMIN D DEFICIENCY: ICD-10-CM

## 2024-04-15 DIAGNOSIS — R79.9 ABNORMAL FINDING OF BLOOD CHEMISTRY, UNSPECIFIED: ICD-10-CM

## 2024-04-15 DIAGNOSIS — E78.5 DYSLIPIDEMIA: ICD-10-CM

## 2024-04-15 LAB
25(OH)D3 SERPL-MCNC: 56.6 NG/ML (ref 30–100)
ALBUMIN SERPL BCP-MCNC: 4.1 G/DL (ref 3.5–5)
ALP SERPL-CCNC: 46 U/L (ref 34–104)
ALT SERPL W P-5'-P-CCNC: 14 U/L (ref 7–52)
ANION GAP SERPL CALCULATED.3IONS-SCNC: 1 MMOL/L (ref 4–13)
AST SERPL W P-5'-P-CCNC: 30 U/L (ref 13–39)
BASOPHILS # BLD AUTO: 0.1 THOUSANDS/ÂΜL (ref 0–0.1)
BASOPHILS NFR BLD AUTO: 2 % (ref 0–1)
BILIRUB DIRECT SERPL-MCNC: 0.15 MG/DL (ref 0–0.2)
BILIRUB SERPL-MCNC: 0.69 MG/DL (ref 0.2–1)
BUN SERPL-MCNC: 15 MG/DL (ref 5–25)
CALCIUM SERPL-MCNC: 9.7 MG/DL (ref 8.4–10.2)
CANCER AG125 SERPL-ACNC: 4.7 U/ML (ref 0–35)
CHLORIDE SERPL-SCNC: 106 MMOL/L (ref 96–108)
CHOLEST SERPL-MCNC: 241 MG/DL
CO2 SERPL-SCNC: 31 MMOL/L (ref 21–32)
CREAT SERPL-MCNC: 0.71 MG/DL (ref 0.6–1.3)
EOSINOPHIL # BLD AUTO: 0.47 THOUSAND/ÂΜL (ref 0–0.61)
EOSINOPHIL NFR BLD AUTO: 11 % (ref 0–6)
ERYTHROCYTE [DISTWIDTH] IN BLOOD BY AUTOMATED COUNT: 14 % (ref 11.6–15.1)
EST. AVERAGE GLUCOSE BLD GHB EST-MCNC: 97 MG/DL
GFR SERPL CREATININE-BSD FRML MDRD: 88 ML/MIN/1.73SQ M
GLUCOSE P FAST SERPL-MCNC: 86 MG/DL (ref 65–99)
HBA1C MFR BLD: 5 %
HCT VFR BLD AUTO: 40.7 % (ref 34.8–46.1)
HDLC SERPL-MCNC: 91 MG/DL
HGB BLD-MCNC: 13.1 G/DL (ref 11.5–15.4)
IMM GRANULOCYTES # BLD AUTO: 0.01 THOUSAND/UL (ref 0–0.2)
IMM GRANULOCYTES NFR BLD AUTO: 0 % (ref 0–2)
LDLC SERPL CALC-MCNC: 140 MG/DL (ref 0–100)
LYMPHOCYTES # BLD AUTO: 1.56 THOUSANDS/ÂΜL (ref 0.6–4.47)
LYMPHOCYTES NFR BLD AUTO: 38 % (ref 14–44)
MCH RBC QN AUTO: 30.3 PG (ref 26.8–34.3)
MCHC RBC AUTO-ENTMCNC: 32.2 G/DL (ref 31.4–37.4)
MCV RBC AUTO: 94 FL (ref 82–98)
MONOCYTES # BLD AUTO: 0.4 THOUSAND/ÂΜL (ref 0.17–1.22)
MONOCYTES NFR BLD AUTO: 10 % (ref 4–12)
NEUTROPHILS # BLD AUTO: 1.6 THOUSANDS/ÂΜL (ref 1.85–7.62)
NEUTS SEG NFR BLD AUTO: 39 % (ref 43–75)
NRBC BLD AUTO-RTO: 0 /100 WBCS
PLATELET # BLD AUTO: 214 THOUSANDS/UL (ref 149–390)
PMV BLD AUTO: 9.3 FL (ref 8.9–12.7)
POTASSIUM SERPL-SCNC: 5.1 MMOL/L (ref 3.5–5.3)
PROT SERPL-MCNC: 6.8 G/DL (ref 6.4–8.4)
RBC # BLD AUTO: 4.33 MILLION/UL (ref 3.81–5.12)
SODIUM SERPL-SCNC: 138 MMOL/L (ref 135–147)
TRIGL SERPL-MCNC: 52 MG/DL
TSH SERPL DL<=0.05 MIU/L-ACNC: 2.92 UIU/ML (ref 0.45–4.5)
WBC # BLD AUTO: 4.14 THOUSAND/UL (ref 4.31–10.16)

## 2024-04-15 PROCEDURE — 86304 IMMUNOASSAY TUMOR CA 125: CPT

## 2024-04-15 PROCEDURE — 84443 ASSAY THYROID STIM HORMONE: CPT

## 2024-04-15 PROCEDURE — 80076 HEPATIC FUNCTION PANEL: CPT

## 2024-04-15 PROCEDURE — 82306 VITAMIN D 25 HYDROXY: CPT

## 2024-04-15 PROCEDURE — 80048 BASIC METABOLIC PNL TOTAL CA: CPT

## 2024-04-15 PROCEDURE — 85025 COMPLETE CBC W/AUTO DIFF WBC: CPT

## 2024-04-15 PROCEDURE — 80061 LIPID PANEL: CPT

## 2024-04-15 PROCEDURE — 83036 HEMOGLOBIN GLYCOSYLATED A1C: CPT

## 2024-04-15 PROCEDURE — 36415 COLL VENOUS BLD VENIPUNCTURE: CPT

## 2024-04-19 ENCOUNTER — OFFICE VISIT (OUTPATIENT)
Dept: INTERNAL MEDICINE CLINIC | Facility: CLINIC | Age: 69
End: 2024-04-19
Payer: MEDICARE

## 2024-04-19 VITALS
DIASTOLIC BLOOD PRESSURE: 66 MMHG | SYSTOLIC BLOOD PRESSURE: 120 MMHG | TEMPERATURE: 97 F | RESPIRATION RATE: 18 BRPM | WEIGHT: 155 LBS | OXYGEN SATURATION: 97 % | HEIGHT: 67 IN | HEART RATE: 90 BPM | BODY MASS INDEX: 24.33 KG/M2

## 2024-04-19 DIAGNOSIS — E78.00 ELEVATED LDL CHOLESTEROL LEVEL: ICD-10-CM

## 2024-04-19 DIAGNOSIS — E78.5 DYSLIPIDEMIA: ICD-10-CM

## 2024-04-19 DIAGNOSIS — Z00.00 MEDICARE ANNUAL WELLNESS VISIT, SUBSEQUENT: Primary | ICD-10-CM

## 2024-04-19 DIAGNOSIS — D70.9 NEUTROPENIA, UNSPECIFIED TYPE (HCC): ICD-10-CM

## 2024-04-19 DIAGNOSIS — M81.0 AGE-RELATED OSTEOPOROSIS WITHOUT CURRENT PATHOLOGICAL FRACTURE: ICD-10-CM

## 2024-04-19 PROCEDURE — G0439 PPPS, SUBSEQ VISIT: HCPCS | Performed by: INTERNAL MEDICINE

## 2024-04-19 PROCEDURE — 99214 OFFICE O/P EST MOD 30 MIN: CPT | Performed by: INTERNAL MEDICINE

## 2024-04-19 RX ORDER — ATORVASTATIN CALCIUM 10 MG/1
10 TABLET, FILM COATED ORAL DAILY
Qty: 90 TABLET | Refills: 1 | Status: SHIPPED | OUTPATIENT
Start: 2024-04-19

## 2024-04-19 NOTE — PATIENT INSTRUCTIONS
Medicare Preventive Visit Patient Instructions  Thank you for completing your Welcome to Medicare Visit or Medicare Annual Wellness Visit today. Your next wellness visit will be due in one year (4/20/2025).  The screening/preventive services that you may require over the next 5-10 years are detailed below. Some tests may not apply to you based off risk factors and/or age. Screening tests ordered at today's visit but not completed yet may show as past due. Also, please note that scanned in results may not display below.  Preventive Screenings:  Service Recommendations Previous Testing/Comments   Colorectal Cancer Screening  * Colonoscopy    * Fecal Occult Blood Test (FOBT)/Fecal Immunochemical Test (FIT)  * Fecal DNA/Cologuard Test  * Flexible Sigmoidoscopy Age: 45-75 years old   Colonoscopy: every 10 years (may be performed more frequently if at higher risk)  OR  FOBT/FIT: every 1 year  OR  Cologuard: every 3 years  OR  Sigmoidoscopy: every 5 years  Screening may be recommended earlier than age 45 if at higher risk for colorectal cancer. Also, an individualized decision between you and your healthcare provider will decide whether screening between the ages of 76-85 would be appropriate. Colonoscopy: 09/23/2022  FOBT/FIT: Not on file  Cologuard: Not on file  Sigmoidoscopy: Not on file          Breast Cancer Screening Age: 40+ years old  Frequency: every 1-2 years  Not required if history of left and right mastectomy Mammogram: 12/11/2023        Cervical Cancer Screening Between the ages of 21-29, pap smear recommended once every 3 years.   Between the ages of 30-65, can perform pap smear with HPV co-testing every 5 years.   Recommendations may differ for women with a history of total hysterectomy, cervical cancer, or abnormal pap smears in past. Pap Smear: Not on file        Hepatitis C Screening Once for adults born between 1945 and 1965  More frequently in patients at high risk for Hepatitis C Hep C Antibody: Not  on file        Diabetes Screening 1-2 times per year if you're at risk for diabetes or have pre-diabetes Fasting glucose: 86 mg/dL (4/15/2024)  A1C: 5.0 % (4/15/2024)      Cholesterol Screening Once every 5 years if you don't have a lipid disorder. May order more often based on risk factors. Lipid panel: 04/15/2024          Other Preventive Screenings Covered by Medicare:  Abdominal Aortic Aneurysm (AAA) Screening: covered once if your at risk. You're considered to be at risk if you have a family history of AAA.  Lung Cancer Screening: covers low dose CT scan once per year if you meet all of the following conditions: (1) Age 55-77; (2) No signs or symptoms of lung cancer; (3) Current smoker or have quit smoking within the last 15 years; (4) You have a tobacco smoking history of at least 20 pack years (packs per day multiplied by number of years you smoked); (5) You get a written order from a healthcare provider.  Glaucoma Screening: covered annually if you're considered high risk: (1) You have diabetes OR (2) Family history of glaucoma OR (3)  aged 50 and older OR (4)  American aged 65 and older  Osteoporosis Screening: covered every 2 years if you meet one of the following conditions: (1) You're estrogen deficient and at risk for osteoporosis based off medical history and other findings; (2) Have a vertebral abnormality; (3) On glucocorticoid therapy for more than 3 months; (4) Have primary hyperparathyroidism; (5) On osteoporosis medications and need to assess response to drug therapy.   Last bone density test (DXA Scan): 05/07/2018.  HIV Screening: covered annually if you're between the age of 15-65. Also covered annually if you are younger than 15 and older than 65 with risk factors for HIV infection. For pregnant patients, it is covered up to 3 times per pregnancy.    Immunizations:  Immunization Recommendations   Influenza Vaccine Annual influenza vaccination during flu season is  recommended for all persons aged >= 6 months who do not have contraindications   Pneumococcal Vaccine   * Pneumococcal conjugate vaccine = PCV13 (Prevnar 13), PCV15 (Vaxneuvance), PCV20 (Prevnar 20)  * Pneumococcal polysaccharide vaccine = PPSV23 (Pneumovax) Adults 19-63 yo with certain risk factors or if 65+ yo  If never received any pneumonia vaccine: recommend Prevnar 20 (PCV20)  Give PCV20 if previously received 1 dose of PCV13 or PPSV23   Hepatitis B Vaccine 3 dose series if at intermediate or high risk (ex: diabetes, end stage renal disease, liver disease)   Respiratory syncytial virus (RSV) Vaccine - COVERED BY MEDICARE PART D  * RSVPreF3 (Arexvy) CDC recommends that adults 60 years of age and older may receive a single dose of RSV vaccine using shared clinical decision-making (SCDM)   Tetanus (Td) Vaccine - COST NOT COVERED BY MEDICARE PART B Following completion of primary series, a booster dose should be given every 10 years to maintain immunity against tetanus. Td may also be given as tetanus wound prophylaxis.   Tdap Vaccine - COST NOT COVERED BY MEDICARE PART B Recommended at least once for all adults. For pregnant patients, recommended with each pregnancy.   Shingles Vaccine (Shingrix) - COST NOT COVERED BY MEDICARE PART B  2 shot series recommended in those 19 years and older who have or will have weakened immune systems or those 50 years and older     Health Maintenance Due:      Topic Date Due   • Hepatitis C Screening  Never done   • Breast Cancer Screening: Mammogram  12/11/2025   • Colorectal Cancer Screening  09/23/2032     Immunizations Due:      Topic Date Due   • COVID-19 Vaccine (8 - 2023-24 season) 11/17/2023     Advance Directives   What are advance directives?  Advance directives are legal documents that state your wishes and plans for medical care. These plans are made ahead of time in case you lose your ability to make decisions for yourself. Advance directives can apply to any medical  decision, such as the treatments you want, and if you want to donate organs.   What are the types of advance directives?  There are many types of advance directives, and each state has rules about how to use them. You may choose a combination of any of the following:  Living will:  This is a written record of the treatment you want. You can also choose which treatments you do not want, which to limit, and which to stop at a certain time. This includes surgery, medicine, IV fluid, and tube feedings.   Durable power of  for healthcare (DPAHC):  This is a written record that states who you want to make healthcare choices for you when you are unable to make them for yourself. This person, called a proxy, is usually a family member or a friend. You may choose more than 1 proxy.  Do not resuscitate (DNR) order:  A DNR order is used in case your heart stops beating or you stop breathing. It is a request not to have certain forms of treatment, such as CPR. A DNR order may be included in other types of advance directives.  Medical directive:  This covers the care that you want if you are in a coma, near death, or unable to make decisions for yourself. You can list the treatments you want for each condition. Treatment may include pain medicine, surgery, blood transfusions, dialysis, IV or tube feedings, and a ventilator (breathing machine).  Values history:  This document has questions about your views, beliefs, and how you feel and think about life. This information can help others choose the care that you would choose.  Why are advance directives important?  An advance directive helps you control your care. Although spoken wishes may be used, it is better to have your wishes written down. Spoken wishes can be misunderstood, or not followed. Treatments may be given even if you do not want them. An advance directive may make it easier for your family to make difficult choices about your care.       © Copyright [employer] 2018 Information is for End User's use only and may not be sold, redistributed or otherwise used for commercial purposes. All illustrations and images included in CareNotes® are the copyrighted property of A.D.A.M., Inc[employer] or [employer]

## 2024-04-19 NOTE — ASSESSMENT & PLAN NOTE
LDL of 140 on most recent lipid panel.  Reviewed CT coronary calcium scan.  CAC score of 36.  No family history of CAD.  Would favor starting statin, patient agreeable.  Start atorvastatin 10 mg daily.  Recheck lipid panel in 3 months

## 2024-04-19 NOTE — PROGRESS NOTES
Assessment and Plan:     Medical history of osteoporosis, elevated LDL level, left ovarian cyst     Problem List Items Addressed This Visit     Age-related osteoporosis without current pathological fracture     -Managed by endocrinology  -Reclast No. 3 December 2023  -Due for repeat DEXA scan July 2025  -Adequate calcium, vitamin D intake, regular weightbearing exercise is recommended         Elevated LDL cholesterol level     LDL of 140 on most recent lipid panel.  Reviewed CT coronary calcium scan.  CAC score of 36.  No family history of CAD.  Would favor starting statin, patient agreeable.  Start atorvastatin 10 mg daily.  Recheck lipid panel in 3 months        Other Visit Diagnoses     Medicare annual wellness visit, subsequent    -  Primary    Dyslipidemia        Relevant Medications    atorvastatin (LIPITOR) 10 mg tablet    Other Relevant Orders    Lipid Panel with Direct LDL reflex    Neutropenia, unspecified type (HCC)        Relevant Orders    CBC and differential           Preventive health issues were discussed with patient, and age appropriate screening tests were ordered as noted in patient's After Visit Summary.  Personalized health advice and appropriate referrals for health education or preventive services given if needed, as noted in patient's After Visit Summary.     History of Present Illness:     Patient presents for a Medicare Wellness Visit    HPI   Patient Care Team:  Nolan Gonzalez DO as PCP - General (Internal Medicine)  Storm Rouse MD as PCP - PCP-[facility] Attributed-Noemi Stockton MD as PCP - Endocrinology (Endocrinology)     Review of Systems:     Review of Systems     Problem List:     Patient Active Problem List   Diagnosis   • Mechanical low back pain   • Age-related osteoporosis without current pathological fracture   • Left ovarian cyst   • Elevated LDL cholesterol level      Past Medical and Surgical History:     Past Medical History:   Diagnosis Date   •  HL (hearing loss) 2019    mild to moderate in both ears, I believe     Past Surgical History:   Procedure Laterality Date   •  SECTION      x4      Family History:     Family History   Problem Relation Age of Onset   • Uterine cancer Mother    • Thyroid disease unspecified Mother    • Cancer Mother         Endometrial cancer   • Rashes / Skin problems Mother         squamous and basal cell skin cancers   • Pancreatic cancer Father    • Cancer Father         Pancreatic cancer   • Rashes / Skin problems Father         basal cell  skin cancer   • No Known Problems Brother    • Lung disease Son         CF mutation   • No Known Problems Son    • No Known Problems Daughter       Social History:     Social History     Socioeconomic History   • Marital status: /Civil Union     Spouse name: None   • Number of children: None   • Years of education: None   • Highest education level: None   Occupational History   • 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 [employer]   Tobacco Use   • Smoking status: Former     Current packs/day: 0.00     Average packs/day: 2.0 packs/day for 7.0 years (14.0 ttl pk-yrs)     Types: Cigarettes     Start date: 1975     Quit date: 1982     Years since quittin.0   • Smokeless tobacco: Never   Vaping Use   • Vaping status: Never Used   Substance and Sexual Activity   • Alcohol use: Yes     Alcohol/week: 5.0 standard drinks of alcohol     Types: 5 Glasses of wine per week     Comment: social   • Drug use: No   • Sexual activity: Yes     Partners: Male     Birth control/protection: Post-menopausal   Other Topics Concern   • None   Social History Narrative    Engages in travel abroad     Social Determinants of Health     Financial Resource Strain: Low Risk  (2023)    Received from [facility]    Overall Financial Resource Strain (CARDIA)    • Difficulty of Paying Living Expenses: Not hard at all   Food Insecurity: No Food Insecurity  (4/18/2024)    Hunger Vital Sign    • Worried About Running Out of Food in the Last Year: Never true    • Ran Out of Food in the Last Year: Never true   Transportation Needs: No Transportation Needs (4/18/2024)    PRAPARE - Transportation    • Lack of Transportation (Medical): No    • Lack of Transportation (Non-Medical): No   Physical Activity: Sufficiently Active (9/26/2023)    Received from [facility]    Exercise Vital Sign    • Days of Exercise per Week: 7 days    • Minutes of Exercise per Session: 30 min   Stress: No Stress Concern Present (9/26/2023)    Received from [facility]    [employer] - Occupational Stress Questionnaire    • Feeling of Stress : Not at all   Social Connections: Unknown (9/26/2023)    Received from [facility]    Social Connection and Isolation Panel [NHANES]    • Frequency of Communication with Friends and Family: More than three times a week    • Frequency of Social Gatherings with Friends and Family: More than three times a week    • Attends Gnosticism Services: Patient declined    • Active Member of Clubs or Organizations: Yes    • Attends Club or Organization Meetings: Patient declined    • Marital Status:    Intimate Partner Violence: Not At Risk (9/26/2023)    Received from [facility]    Humiliation, Afraid, Rape, and Kick questionnaire    • Fear of Current or Ex-Partner: No    • Emotionally Abused: No    • Physically Abused: No    • Sexually Abused: No   Housing Stability: Low Risk  (4/18/2024)    Housing Stability Vital Sign    • Unable to Pay for Housing in the Last Year: No    • Number of Places Lived in the Last Year: 1    • Unstable Housing in the Last Year: No      Medications and Allergies:     Current Outpatient Medications   Medication Sig Dispense Refill   • atorvastatin (LIPITOR) 10 mg tablet Take 1 tablet (10 mg total) by mouth daily 90 tablet 1   • Calcium  Carbonate (CALCIUM 600 PO) Take by mouth 2 (two) times a day     • Calcium-Vitamin D-Vitamin K 500-1000-40 MG-UNT-MCG CHEW 1 chew po daily     • cholecalciferol (VITAMIN D3) 1,000 units tablet Take 1,000 Units by mouth daily prn     • Multiple Vitamin (MULTIVITAMINS PO) Take by mouth     • estradiol (ESTRACE) 0.1 mg/g vaginal cream Use 1 gm nightly for two weeks then twice weekly for maintenance (Patient not taking: Reported on 11/13/2023)       No current facility-administered medications for this visit.     Allergies   Allergen Reactions   • Fentanyl Vomiting      Immunizations:     Immunization History   Administered Date(s) Administered   • COVID-19 MODERNA VACC 0.5 ML IM 01/05/2021, 02/04/2021, 10/25/2021, 05/06/2022   • COVID-19 Moderna Vac BIVALENT 12 Yr+ IM 0.5 ML 09/11/2022, 05/25/2023   • INFLUENZA 01/11/2013, 10/23/2013, 10/07/2015, 10/25/2015, 12/01/2016, 10/21/2017, 12/01/2017, 10/15/2022   • Influenza Quadrivalent Preservative Free 3 years and older IM 12/01/2016   • Influenza, recombinant, quadrivalent,injectable, preservative free 10/08/2018   • Influenza, seasonal, injectable 10/07/2015   • Influenza, seasonal, injectable, preservative free 10/21/2017, 12/01/2017   • Pneumococcal Polysaccharide PPV23 07/13/2021   • Tdap 03/11/2022   • Typhoid Live, oral 08/25/2017   • Typhoid, Unspecified 08/25/2017   • Zoster Vaccine Recombinant 04/29/2019, 07/15/2019      Health Maintenance:         Topic Date Due   • Hepatitis C Screening  Never done   • Breast Cancer Screening: Mammogram  12/11/2025   • Colorectal Cancer Screening  09/23/2032         Topic Date Due   • COVID-19 Vaccine (8 - 2023-24 season) 11/17/2023      Medicare Screening Tests and Risk Assessments:         Health Risk Assessment:   Patient rates overall health as excellent. Patient feels that their physical health rating is same. Patient is very satisfied with their life. Eyesight was rated as same. Hearing was rated as slightly worse.  Patient feels that their emotional and mental health rating is same. Patients states they are never, rarely angry. Patient states they are never, rarely unusually tired/fatigued. Pain experienced in the last 7 days has been none. Patient states that she has experienced no weight loss or gain in last 6 months.     Depression Screening:   PHQ-2 Score: 0      Fall Risk Screening:   In the past year, patient has experienced: no history of falling in past year      Urinary Incontinence Screening:   Patient has not leaked urine accidently in the last six months.     Home Safety:  Patient does not have trouble with stairs inside or outside of their home. Patient has working smoke alarms and has working carbon monoxide detector. Home safety hazards include: none.     Nutrition:   Current diet is Regular.     Medications:   Patient is not currently taking any over-the-counter supplements. Patient is able to manage medications.     Activities of Daily Living (ADLs)/Instrumental Activities of Daily Living (IADLs):   Walk and transfer into and out of bed and chair?: Yes  Dress and groom yourself?: Yes    Bathe or shower yourself?: Yes    Feed yourself? Yes  Do your laundry/housekeeping?: Yes  Manage your money, pay your bills and track your expenses?: Yes  Make your own meals?: Yes    Do your own shopping?: Yes    Previous Hospitalizations:   Any hospitalizations or ED visits within the last 12 months?: No      Advance Care Planning:   Living will: Yes    Durable POA for healthcare: Yes    Advanced directive: Yes      Cognitive Screening:   Provider or family/friend/caregiver concerned regarding cognition?: No    PREVENTIVE SCREENINGS      Cardiovascular Screening:    General: Screening Current      Diabetes Screening:     General: Screening Current      Colorectal Cancer Screening:     General: Screening Current      Breast Cancer Screening:     General: Screening Current      Cervical Cancer Screening:    General: Screening  Not Indicated      Osteoporosis Screening:    General: Screening Not Indicated and History Osteoporosis      Abdominal Aortic Aneurysm (AAA) Screening:        General: Screening Current      Lung Cancer Screening:     General: Screening Not Indicated      Hepatitis C Screening:    General: Screening Not Indicated    Screening, Brief Intervention, and Referral to Treatment (SBIRT)    Screening  Typical number of drinks in a day: 2  Typical number of drinks in a week: 10  Interpretation: Low risk drinking behavior.    AUDIT-C Screenin) How often did you have a drink containing alcohol in the past year? 4 or more times a week  2) How many drinks did you have on a typical day when you were drinking in the past year? 1 to 2  3) How often did you have 6 or more drinks on one occasion in the past year? never    AUDIT-C Score: 4  Interpretation: Score 3-12 (female): POSITIVE screen for alcohol misuse    AUDIT Screenin) How often during the last year have you found that you were not able to stop drinking once you had started? 0 - never  5) How often during the last year have you failed to do what was normally expected from you because of drinking? 0 - never  6) How often during the last year have you needed a first drink in the morning to get yourself going after a heavy drinking session? 0 - never  7) How often during the last year have you had a feeling of guilt or remorse after drinking? 0 - never  8) How often during the last year have you been unable to remember what happened the night before because you had been drinking? 0 - never  9) Have you or someone else been injured as a result of your drinking? 0 - no  10) Has a relative or friend or a doctor or another health worker been concerned about your drinking or suggested you cut down? 0 - no    AUDIT Score: 4  Interpretation: Low risk alcohol consumption    Single Item Drug Screening:  How often have you used an illegal drug (including marijuana) or a  "prescription medication for non-medical reasons in the past year? never    Single Item Drug Screen Score: 0  Interpretation: Negative screen for possible drug use disorder    Brief Intervention  Alcohol & drug use screenings were reviewed. No concerns regarding substance use disorder identified.     No results found.     Physical Exam:     /66 (BP Location: Left arm, Patient Position: Sitting, Cuff Size: Standard)   Pulse 90   Temp (!) 97 °F (36.1 °C)   Resp 18   Ht 5' 7\" (1.702 m)   Wt 70.3 kg (155 lb)   SpO2 97%   BMI 24.28 kg/m²     Physical Exam  Cardiovascular:      Rate and Rhythm: Normal rate and regular rhythm.      Heart sounds: Normal heart sounds. No murmur heard.  Pulmonary:      Effort: Pulmonary effort is normal.      Breath sounds: Normal breath sounds. No wheezing, rhonchi or rales.          Nolan Gonzalez, DO  "

## 2024-04-19 NOTE — ASSESSMENT & PLAN NOTE
-Managed by endocrinology  [facility]  -Due for repeat DEXA scan July 2025  -Adequate calcium, vitamin D intake, regular weightbearing exercise is recommended

## 2024-07-01 ENCOUNTER — PATIENT MESSAGE (OUTPATIENT)
Dept: INTERNAL MEDICINE CLINIC | Facility: CLINIC | Age: 69
End: 2024-07-01

## 2024-07-01 DIAGNOSIS — M25.552 LEFT HIP PAIN: Primary | ICD-10-CM

## 2024-07-22 ENCOUNTER — APPOINTMENT (OUTPATIENT)
Dept: LAB | Facility: HOSPITAL | Age: 69
End: 2024-07-22
Payer: MEDICARE

## 2024-07-22 DIAGNOSIS — E78.5 DYSLIPIDEMIA: ICD-10-CM

## 2024-07-22 DIAGNOSIS — D70.9 NEUTROPENIA, UNSPECIFIED TYPE (HCC): ICD-10-CM

## 2024-07-22 LAB
BASOPHILS # BLD AUTO: 0.1 THOUSANDS/ÂΜL (ref 0–0.1)
BASOPHILS NFR BLD AUTO: 2 % (ref 0–1)
CHOLEST SERPL-MCNC: 192 MG/DL
EOSINOPHIL # BLD AUTO: 0.44 THOUSAND/ÂΜL (ref 0–0.61)
EOSINOPHIL NFR BLD AUTO: 9 % (ref 0–6)
ERYTHROCYTE [DISTWIDTH] IN BLOOD BY AUTOMATED COUNT: 13.4 % (ref 11.6–15.1)
HCT VFR BLD AUTO: 42.2 % (ref 34.8–46.1)
HDLC SERPL-MCNC: 102 MG/DL
HGB BLD-MCNC: 13.5 G/DL (ref 11.5–15.4)
IMM GRANULOCYTES # BLD AUTO: 0.01 THOUSAND/UL (ref 0–0.2)
IMM GRANULOCYTES NFR BLD AUTO: 0 % (ref 0–2)
LDLC SERPL CALC-MCNC: 79 MG/DL (ref 0–100)
LYMPHOCYTES # BLD AUTO: 1.8 THOUSANDS/ÂΜL (ref 0.6–4.47)
LYMPHOCYTES NFR BLD AUTO: 38 % (ref 14–44)
MCH RBC QN AUTO: 29.8 PG (ref 26.8–34.3)
MCHC RBC AUTO-ENTMCNC: 32 G/DL (ref 31.4–37.4)
MCV RBC AUTO: 93 FL (ref 82–98)
MONOCYTES # BLD AUTO: 0.42 THOUSAND/ÂΜL (ref 0.17–1.22)
MONOCYTES NFR BLD AUTO: 9 % (ref 4–12)
NEUTROPHILS # BLD AUTO: 1.93 THOUSANDS/ÂΜL (ref 1.85–7.62)
NEUTS SEG NFR BLD AUTO: 42 % (ref 43–75)
NRBC BLD AUTO-RTO: 0 /100 WBCS
PLATELET # BLD AUTO: 256 THOUSANDS/UL (ref 149–390)
PMV BLD AUTO: 9.7 FL (ref 8.9–12.7)
RBC # BLD AUTO: 4.53 MILLION/UL (ref 3.81–5.12)
TRIGL SERPL-MCNC: 56 MG/DL
WBC # BLD AUTO: 4.7 THOUSAND/UL (ref 4.31–10.16)

## 2024-07-22 PROCEDURE — 80061 LIPID PANEL: CPT

## 2024-07-22 PROCEDURE — 36415 COLL VENOUS BLD VENIPUNCTURE: CPT

## 2024-07-22 PROCEDURE — 85025 COMPLETE CBC W/AUTO DIFF WBC: CPT

## 2024-08-19 ENCOUNTER — APPOINTMENT (OUTPATIENT)
Dept: LAB | Facility: MEDICAL CENTER | Age: 69
End: 2024-08-19
Payer: MEDICARE

## 2024-08-19 ENCOUNTER — APPOINTMENT (OUTPATIENT)
Dept: RADIOLOGY | Facility: MEDICAL CENTER | Age: 69
End: 2024-08-19
Payer: MEDICARE

## 2024-08-19 ENCOUNTER — OFFICE VISIT (OUTPATIENT)
Dept: OBGYN CLINIC | Facility: MEDICAL CENTER | Age: 69
End: 2024-08-19
Payer: MEDICARE

## 2024-08-19 VITALS
BODY MASS INDEX: 23.73 KG/M2 | WEIGHT: 151.2 LBS | HEIGHT: 67 IN | SYSTOLIC BLOOD PRESSURE: 127 MMHG | DIASTOLIC BLOOD PRESSURE: 79 MMHG | HEART RATE: 57 BPM

## 2024-08-19 DIAGNOSIS — Z00.6 ENCOUNTER FOR EXAMINATION FOR NORMAL COMPARISON OR CONTROL IN CLINICAL RESEARCH PROGRAM: ICD-10-CM

## 2024-08-19 DIAGNOSIS — M25.552 LEFT HIP PAIN: ICD-10-CM

## 2024-08-19 DIAGNOSIS — M70.62 TROCHANTERIC BURSITIS OF LEFT HIP: Primary | ICD-10-CM

## 2024-08-19 PROCEDURE — 73502 X-RAY EXAM HIP UNI 2-3 VIEWS: CPT

## 2024-08-19 PROCEDURE — 20610 DRAIN/INJ JOINT/BURSA W/O US: CPT | Performed by: ORTHOPAEDIC SURGERY

## 2024-08-19 PROCEDURE — 99204 OFFICE O/P NEW MOD 45 MIN: CPT | Performed by: ORTHOPAEDIC SURGERY

## 2024-08-19 PROCEDURE — 36415 COLL VENOUS BLD VENIPUNCTURE: CPT

## 2024-08-19 RX ORDER — BUPIVACAINE HYDROCHLORIDE 2.5 MG/ML
4 INJECTION, SOLUTION INFILTRATION; PERINEURAL
Status: COMPLETED | OUTPATIENT
Start: 2024-08-19 | End: 2024-08-19

## 2024-08-19 RX ORDER — METHYLPREDNISOLONE ACETATE 40 MG/ML
1 INJECTION, SUSPENSION INTRA-ARTICULAR; INTRALESIONAL; INTRAMUSCULAR; SOFT TISSUE
Status: COMPLETED | OUTPATIENT
Start: 2024-08-19 | End: 2024-08-19

## 2024-08-19 RX ADMIN — BUPIVACAINE HYDROCHLORIDE 4 ML: 2.5 INJECTION, SOLUTION INFILTRATION; PERINEURAL at 14:30

## 2024-08-19 RX ADMIN — METHYLPREDNISOLONE ACETATE 1 ML: 40 INJECTION, SUSPENSION INTRA-ARTICULAR; INTRALESIONAL; INTRAMUSCULAR; SOFT TISSUE at 14:30

## 2024-08-19 NOTE — PROGRESS NOTES
"Orthopaedic Surgery - Office Note  Gilda Richard (68 y.o. female)   : 1955   MRN: [id number]  Encounter Date: 2024    Assessment / Plan    Left trochanteric bursitis with mild OA    Exam today is consistent with GT bursitis   Discussed CS and / or physical therapy  Patient can also discuss switching off of the Lipitor as statins are known to cause muscle aches, discuss the PCP  Patient elected to for a CS injection today tolerated well.  Script provided for PT   Follow-up:  Return if symptoms worsen or fail to improve.      Chief Complaint / Date of Onset  Left hip pain / 2024  Injury Mechanism / Date  None  Surgery / Date  None    History of Present Illness   Gilda Richard is a 68 y.o. female who presents for left hip pain.  Patient reports her pain started after starting Lipitor.  Patient reports she knows statins can cause pain but she wanted to make sure nothing is wrong with the hip.  Patient reports lateral hip pain that will radiate distally along the IT band.  She denies groin and lumbar pain.  Patient is an avid walker and ex marathon runner    Treatment Summary  Medications / Modalities  None  Bracing / Immobilization  None  Physical Therapy  None  Injections  None  Prior Surgeries  None  Other Treatments  None    Employment / Current Status  Retired    Sport / Organization / Current Status  Runner / Walker      Review of Systems  Pertinent items are noted in HPI.  All other systems were reviewed and are negative.      Physical Exam  /79   Pulse 57   Ht 5' 7\" (1.702 m)   Wt 68.6 kg (151 lb 3.2 oz)   BMI 23.68 kg/m²   Cons: Appears well.  No apparent distress.  Psych: Alert. Oriented x3.  Mood and affect normal.  Eyes: PERRLA, EOMI  Resp: Normal effort.  No audible wheezing or stridor.  CV: Palpable pulse.  No discernable arrhythmia.  No LE edema.  Lymph:  No palpable cervical, axillary, or inguinal lymphadenopathy.  Skin: Warm.  No palpable masses.  No visible " lesions.  Neuro: Normal muscle tone.  Normal and symmetric DTR's.     Left Hip Exam  Alignment / Posture:  Normal resting hip posture.  Inspection:  No swelling. No edema. No erythema. No ecchymosis.  Palpation:   Posterior GT tenderness.  ROM:  Normal hip ROM.  Strength:  5/5 hip flexors and abductors.  Stability:  No objective hip instability.  Tests:  No pertinent positive or negative tests.  Neurovascular:  Sensation intact in DP/SP/Rodriguez/Sa/T nerve distributions. 2+ DP & PT pulses.  Gait:  Normal.       Studies Reviewed  XR of left hip - no fracture or dislocation, mild degenerative changes      Large joint arthrocentesis: L hip joint  Universal Protocol:  procedure performed by consultantConsent: Verbal consent obtained.  Consent given by: patient  Patient understanding: patient states understanding of the procedure being performed  Site marked: the operative site was marked  Patient identity confirmed: verbally with patient  Supporting Documentation  Indications: pain   Procedure Details  Location: hip - L hip joint  Needle size: 22 G  Ultrasound guidance: no  Medications administered: 4 mL bupivacaine 0.25 %; 1 mL methylPREDNISolone acetate 40 mg/mL    Patient tolerance: patient tolerated the procedure well with no immediate complications  Dressing:  Sterile dressing applied          Medical, Surgical, Family, and Social History  The patient's medical history, family history, and social history, were reviewed and updated as appropriate.    Past Medical History:   Diagnosis Date    HL (hearing loss) 2019    mild to moderate in both ears, I believe       Past Surgical History:   Procedure Laterality Date     SECTION      x4       Family History   Problem Relation Age of Onset    Uterine cancer Mother     Thyroid disease unspecified Mother     Cancer Mother         Endometrial cancer    Rashes / Skin problems Mother         squamous and basal cell skin cancers    Pancreatic cancer Father     Cancer Father          Pancreatic cancer    Rashes / Skin problems Father         basal cell  skin cancer    No Known Problems Brother     Lung disease Son         CF mutation    No Known Problems Son     No Known Problems Daughter        Social History     Occupational History    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 [email or web address]   Tobacco Use    Smoking status: Former     Current packs/day: 0.00     Average packs/day: 2.0 packs/day for 7.0 years (14.0 ttl pk-yrs)     Types: Cigarettes     Start date: 1975     Quit date: 1982     Years since quittin.4    Smokeless tobacco: Never   Vaping Use    Vaping status: Never Used   Substance and Sexual Activity    Alcohol use: Yes     Alcohol/week: 5.0 standard drinks of alcohol     Types: 5 Glasses of wine per week     Comment: social    Drug use: No    Sexual activity: Yes     Partners: Male     Birth control/protection: Post-menopausal       Allergies   Allergen Reactions    Fentanyl Vomiting         Current Outpatient Medications:     atorvastatin (LIPITOR) 10 mg tablet, Take 1 tablet (10 mg total) by mouth daily, Disp: 90 tablet, Rfl: 1    Calcium Carbonate (CALCIUM 600 PO), Take by mouth 2 (two) times a day, Disp: , Rfl:     Calcium-Vitamin D-Vitamin K 500-1000-40 MG-UNT-MCG CHEW, 1 chew po daily, Disp: , Rfl:     cholecalciferol (VITAMIN D3) 1,000 units tablet, Take 1,000 Units by mouth daily prn, Disp: , Rfl:     Multiple Vitamin (MULTIVITAMINS PO), Take by mouth, Disp: , Rfl:     estradiol (ESTRACE) 0.1 mg/g vaginal cream, Use 1 gm nightly for two weeks then twice weekly for maintenance (Patient not taking: Reported on 2023), Disp: , Rfl:       Vannessa Lind MA    Scribe Attestation      I,:  Vannessa Lind MA am acting as a scribe while in the presence of the attending physician.:       I,:  Jose Guadalupe Schwartz MD personally performed the services described in this documentation    as scribed in my presence.:

## 2024-10-03 DIAGNOSIS — E78.5 DYSLIPIDEMIA: ICD-10-CM

## 2024-10-03 RX ORDER — ATORVASTATIN CALCIUM 10 MG/1
10 TABLET, FILM COATED ORAL DAILY
Qty: 90 TABLET | Refills: 1 | Status: SHIPPED | OUTPATIENT
Start: 2024-10-03

## 2024-10-18 LAB
APOB+LDLR+PCSK9 GENE MUT ANL BLD/T: NOT DETECTED
BRCA1+BRCA2 DEL+DUP + FULL MUT ANL BLD/T: NOT DETECTED
MLH1+MSH2+MSH6+PMS2 GN DEL+DUP+FUL M: NOT DETECTED

## 2025-01-09 ENCOUNTER — APPOINTMENT (OUTPATIENT)
Dept: LAB | Facility: HOSPITAL | Age: 70
End: 2025-01-09
Payer: MEDICARE

## 2025-01-09 DIAGNOSIS — M81.0 AGE-RELATED OSTEOPOROSIS WITHOUT CURRENT PATHOLOGICAL FRACTURE: ICD-10-CM

## 2025-01-09 LAB
25(OH)D3 SERPL-MCNC: 74.8 NG/ML (ref 30–100)
ALBUMIN SERPL BCG-MCNC: 4.3 G/DL (ref 3.5–5)
ALP SERPL-CCNC: 66 U/L (ref 34–104)
ALT SERPL W P-5'-P-CCNC: 18 U/L (ref 7–52)
ANION GAP SERPL CALCULATED.3IONS-SCNC: 4 MMOL/L (ref 4–13)
AST SERPL W P-5'-P-CCNC: 30 U/L (ref 13–39)
BILIRUB SERPL-MCNC: 0.53 MG/DL (ref 0.2–1)
BUN SERPL-MCNC: 19 MG/DL (ref 5–25)
CALCIUM SERPL-MCNC: 9.4 MG/DL (ref 8.4–10.2)
CHLORIDE SERPL-SCNC: 103 MMOL/L (ref 96–108)
CO2 SERPL-SCNC: 32 MMOL/L (ref 21–32)
CREAT SERPL-MCNC: 0.73 MG/DL (ref 0.6–1.3)
GFR SERPL CREATININE-BSD FRML MDRD: 84 ML/MIN/1.73SQ M
GLUCOSE P FAST SERPL-MCNC: 88 MG/DL (ref 65–99)
POTASSIUM SERPL-SCNC: 4.7 MMOL/L (ref 3.5–5.3)
PROT SERPL-MCNC: 7.3 G/DL (ref 6.4–8.4)
SODIUM SERPL-SCNC: 139 MMOL/L (ref 135–147)

## 2025-01-09 PROCEDURE — 84080 ASSAY ALKALINE PHOSPHATASES: CPT

## 2025-01-09 PROCEDURE — 82523 COLLAGEN CROSSLINKS: CPT

## 2025-01-09 PROCEDURE — 36415 COLL VENOUS BLD VENIPUNCTURE: CPT

## 2025-01-09 PROCEDURE — 80053 COMPREHEN METABOLIC PANEL: CPT

## 2025-01-09 PROCEDURE — 82306 VITAMIN D 25 HYDROXY: CPT

## 2025-01-14 LAB — ALP BONE SERPL-MCNC: 8.8 UG/L

## 2025-01-16 LAB — COLLAGEN CTX SERPL-MCNC: 50 PG/ML

## 2025-04-09 DIAGNOSIS — E78.5 DYSLIPIDEMIA: ICD-10-CM

## 2025-04-10 RX ORDER — ATORVASTATIN CALCIUM 10 MG/1
10 TABLET, FILM COATED ORAL DAILY
Qty: 90 TABLET | Refills: 0 | Status: SHIPPED | OUTPATIENT
Start: 2025-04-10

## 2025-04-21 ENCOUNTER — PATIENT MESSAGE (OUTPATIENT)
Dept: INTERNAL MEDICINE CLINIC | Facility: CLINIC | Age: 70
End: 2025-04-21

## 2025-04-24 ENCOUNTER — EVALUATION (OUTPATIENT)
Dept: PHYSICAL THERAPY | Facility: REHABILITATION | Age: 70
End: 2025-04-24
Attending: ORTHOPAEDIC SURGERY
Payer: MEDICARE

## 2025-04-24 DIAGNOSIS — M70.62 TROCHANTERIC BURSITIS OF LEFT HIP: ICD-10-CM

## 2025-04-24 PROCEDURE — 97161 PT EVAL LOW COMPLEX 20 MIN: CPT | Performed by: PHYSICAL THERAPIST

## 2025-04-24 PROCEDURE — 97110 THERAPEUTIC EXERCISES: CPT | Performed by: PHYSICAL THERAPIST

## 2025-04-24 NOTE — PROGRESS NOTES
PT Evaluation     Today's date: 2025  Patient name: Gilda Richard  : 1955  MRN: [id number]  Referring provider: Jose Guadalupe Schwartz, *  Dx:   Encounter Diagnosis     ICD-10-CM    1. Trochanteric bursitis of left hip  M70.62 Ambulatory Referral to Physical Therapy          Start Time: 1040  Stop Time: 1120  Total time in clinic (min): 40 minutes    Assessment  Impairments: abnormal or restricted ROM, activity intolerance, impaired physical strength, lacks appropriate home exercise program and pain with function  Symptom irritability: moderate    Assessment details: Patient is a 69 y.o. female that presents with left lateral hip pain.  Patient presents with decreased strength and decreased ROM.  Patient has difficulty with recreational activities, sit to stand, prolonged walking, and negotiation of stairs secondary to impairments.  Patient would benefit from skilled physical therapy services to address impairments to maximize function.      Understanding of Dx/Px/POC: good    Goals  Impairment:  1.  Patient will reports 50% reduction in pain in 4 weeks to maximize function.  2.  Patient will improve strength to 4/5 in all planes to maximize function.  3.  Patient will improve ROM to WFL in 4 weeks to maximize function.    Functional:  1. Patient will be independent with HEP in 4 weeks to maximize function.  2. Patient will report no difficulty with prolonged walking in 4 weeks to maximize function.  3. Patient will report no difficulty with sit to  4 weeks to maximize function.  4. Patient will report no difficulty with recreational activities in 4 weeks to maximize function.          Plan  Patient would benefit from: skilled physical therapy  Planned modality interventions: cryotherapy    Planned therapy interventions: abdominal trunk stabilization, activity modification, neuromuscular re-education, manual therapy, patient/caregiver education, therapeutic exercise, strengthening, home  exercise program, functional ROM exercises, therapeutic activities and balance    Frequency: 1x week  Duration in weeks: 4  Treatment plan discussed with: patient  Plan details: Patient will be a RE in 4 weeks.          Subjective Evaluation    History of Present Illness  Mechanism of injury: Patient presents with left lateral hip discomfort over the last year.  She had an injection in 2024 with about 3 months of relief.  Patient reports symptoms have been worsening over the last few months.  She is an active person including running, piliates, cycling, etc.  She continues to be active despite the pain.  Patient reports her symptoms can radiate down the lateral aspect of her leg just past her knee if severe.  Her symptoms are worst after walking for several miles.  Patient will walk up to 5 miles.  Patient reports difficulty with recreational activities, sit to stand, prolonged walking, and negotiation of stairs.  Patient Goals  Patient goals for therapy: decreased pain    Pain  At best pain ratin  At worst pain ratin  Location: L lateral hip  Quality: sharp and dull ache  Aggravating factors: running, stair climbing, walking and standing  Progression: worsening      Diagnostic Tests  X-ray: normal  Treatments  Previous treatment: injection treatment  Current treatment: physical therapy        Objective     Passive Range of Motion   Left Hip   Flexion: WFL and with pain  External rotation (90/90): WFL and with pain  Internal rotation (90/90): WFL and with pain    Additional Passive Range of Motion Details  Empty end feel with hip PROM on left in all directions secondary to pain    Strength/Myotome Testing     Left Hip   Planes of Motion   Flexion: 4  Extension: 4  Abduction: 4- (pain)  External rotation: 4-  Internal rotation: 4    Right Hip   Planes of Motion   Flexion: 4  Extension: 4  Abduction: 4-  External rotation: 4  Internal rotation: 4    Left Knee   Flexion: 4  Extension: 4    Right Knee    Flexion: 4  Extension: 4    Functional Assessment        Single Leg Stance   Left: 30 seconds  Right: 30 seconds    Comments  SLS more challenging on left LE compared to right             Precautions: none      Manuals 4/24                                                                Neuro Re-Ed             SLS with HHA issued            sidestepping             Monster walks             Modified side plank                                                    Ther Ex             treadmill             Sidelying hip abduction issued            Prone hip extension issued            Hip ER band Red issued                                                                Ther Activity                                       Gait Training                                       Modalities             Ice PRN

## 2025-05-01 ENCOUNTER — APPOINTMENT (OUTPATIENT)
Dept: LAB | Facility: HOSPITAL | Age: 70
End: 2025-05-01
Payer: MEDICARE

## 2025-05-01 ENCOUNTER — OFFICE VISIT (OUTPATIENT)
Dept: PHYSICAL THERAPY | Facility: REHABILITATION | Age: 70
End: 2025-05-01
Attending: ORTHOPAEDIC SURGERY
Payer: MEDICARE

## 2025-05-01 ENCOUNTER — RESULTS FOLLOW-UP (OUTPATIENT)
Dept: INTERNAL MEDICINE CLINIC | Facility: CLINIC | Age: 70
End: 2025-05-01

## 2025-05-01 DIAGNOSIS — E78.5 DYSLIPIDEMIA: ICD-10-CM

## 2025-05-01 DIAGNOSIS — M70.62 TROCHANTERIC BURSITIS OF LEFT HIP: Primary | ICD-10-CM

## 2025-05-01 DIAGNOSIS — E78.00 ELEVATED LDL CHOLESTEROL LEVEL: Primary | ICD-10-CM

## 2025-05-01 LAB
ALBUMIN SERPL BCG-MCNC: 4 G/DL (ref 3.5–5)
ALP SERPL-CCNC: 47 U/L (ref 34–104)
ALT SERPL W P-5'-P-CCNC: 17 U/L (ref 7–52)
ANION GAP SERPL CALCULATED.3IONS-SCNC: 5 MMOL/L (ref 4–13)
AST SERPL W P-5'-P-CCNC: 28 U/L (ref 13–39)
BASOPHILS # BLD AUTO: 0.07 THOUSANDS/ÂΜL (ref 0–0.1)
BASOPHILS NFR BLD AUTO: 2 % (ref 0–1)
BILIRUB DIRECT SERPL-MCNC: 0.08 MG/DL (ref 0–0.2)
BILIRUB SERPL-MCNC: 0.51 MG/DL (ref 0.2–1)
BUN SERPL-MCNC: 20 MG/DL (ref 5–25)
CALCIUM SERPL-MCNC: 9.4 MG/DL (ref 8.4–10.2)
CHLORIDE SERPL-SCNC: 106 MMOL/L (ref 96–108)
CHOLEST SERPL-MCNC: 191 MG/DL (ref ?–200)
CO2 SERPL-SCNC: 29 MMOL/L (ref 21–32)
CREAT SERPL-MCNC: 0.7 MG/DL (ref 0.6–1.3)
EOSINOPHIL # BLD AUTO: 0.34 THOUSAND/ÂΜL (ref 0–0.61)
EOSINOPHIL NFR BLD AUTO: 7 % (ref 0–6)
ERYTHROCYTE [DISTWIDTH] IN BLOOD BY AUTOMATED COUNT: 13.2 % (ref 11.6–15.1)
GFR SERPL CREATININE-BSD FRML MDRD: 89 ML/MIN/1.73SQ M
GLUCOSE P FAST SERPL-MCNC: 87 MG/DL (ref 65–99)
HCT VFR BLD AUTO: 39.8 % (ref 34.8–46.1)
HDLC SERPL-MCNC: 84 MG/DL
HGB BLD-MCNC: 12.9 G/DL (ref 11.5–15.4)
IMM GRANULOCYTES # BLD AUTO: 0 THOUSAND/UL (ref 0–0.2)
IMM GRANULOCYTES NFR BLD AUTO: 0 % (ref 0–2)
LDLC SERPL CALC-MCNC: 95 MG/DL (ref 0–100)
LYMPHOCYTES # BLD AUTO: 1.49 THOUSANDS/ÂΜL (ref 0.6–4.47)
LYMPHOCYTES NFR BLD AUTO: 32 % (ref 14–44)
MCH RBC QN AUTO: 30.4 PG (ref 26.8–34.3)
MCHC RBC AUTO-ENTMCNC: 32.4 G/DL (ref 31.4–37.4)
MCV RBC AUTO: 94 FL (ref 82–98)
MONOCYTES # BLD AUTO: 0.46 THOUSAND/ÂΜL (ref 0.17–1.22)
MONOCYTES NFR BLD AUTO: 10 % (ref 4–12)
NEUTROPHILS # BLD AUTO: 2.28 THOUSANDS/ÂΜL (ref 1.85–7.62)
NEUTS SEG NFR BLD AUTO: 49 % (ref 43–75)
NRBC BLD AUTO-RTO: 0 /100 WBCS
PLATELET # BLD AUTO: 212 THOUSANDS/UL (ref 149–390)
PMV BLD AUTO: 9.6 FL (ref 8.9–12.7)
POTASSIUM SERPL-SCNC: 4.8 MMOL/L (ref 3.5–5.3)
PROT SERPL-MCNC: 6.7 G/DL (ref 6.4–8.4)
RBC # BLD AUTO: 4.24 MILLION/UL (ref 3.81–5.12)
SODIUM SERPL-SCNC: 140 MMOL/L (ref 135–147)
TRIGL SERPL-MCNC: 61 MG/DL (ref ?–150)
WBC # BLD AUTO: 4.64 THOUSAND/UL (ref 4.31–10.16)

## 2025-05-01 PROCEDURE — 80076 HEPATIC FUNCTION PANEL: CPT

## 2025-05-01 PROCEDURE — 80061 LIPID PANEL: CPT

## 2025-05-01 PROCEDURE — 97110 THERAPEUTIC EXERCISES: CPT | Performed by: PHYSICAL THERAPIST

## 2025-05-01 PROCEDURE — 85025 COMPLETE CBC W/AUTO DIFF WBC: CPT

## 2025-05-01 PROCEDURE — 97112 NEUROMUSCULAR REEDUCATION: CPT | Performed by: PHYSICAL THERAPIST

## 2025-05-01 PROCEDURE — 80048 BASIC METABOLIC PNL TOTAL CA: CPT

## 2025-05-01 PROCEDURE — 36415 COLL VENOUS BLD VENIPUNCTURE: CPT

## 2025-05-01 NOTE — PATIENT COMMUNICATION
Patient is currently at the lab because she was told she has labs ordered on 4.21, but I don't see any in the system and neither does the lab. Can labs be ordered for patient asap or can patient be contacted if labs are not required for her Monday appointment.    Please advise

## 2025-05-01 NOTE — PROGRESS NOTES
"Daily Note     Today's date: 2025  Patient name: Gilda Richard  : 1955  MRN: [id number]  Referring provider: Jose Guadalupe Schwartz, *  Dx:   Encounter Diagnosis     ICD-10-CM    1. Trochanteric bursitis of left hip  M70.62           Start Time: 1705  Stop Time: 1743  Total time in clinic (min): 38 minutes    Subjective: Patient presents for first visit post IE.  Patient reports no soreness following IE.  She reports no significant changes to date.  She has lateral hip discomfort with SLS.      Objective: See treatment diary below      Assessment: Tolerated treatment well. Patient demonstrated fatigue post treatment, exhibited good technique with therapeutic exercises, and would benefit from continued PT.  Patient would benefit from continued hip strengthening to maximize function.  Issued updated HEP.        Plan: Progress treatment as tolerated.       Precautions: none      Manuals                                                                Neuro Re-Ed             SLS with HHA issued 3x30\" gerardo (R with rotations)           sidestepping  Red 3 laps            Monster walks  Red 3 laps           Modified side plank  3x15\"                                                   Ther Ex             Upright bike  6 min           Sidelying hip abduction issued 5\" 10x L           Prone hip extension issued 5\" 10x L           Hip ER band Red issued Red 5\" 15x                                                               Ther Activity                                       Gait Training                                       Modalities             Ice PRN                               "

## 2025-05-01 NOTE — PATIENT COMMUNICATION
Patient called in reference to her labs. She was told on 4/21/25 that there were lab orders and there are not any. She is at the lab now and did fating and wanted to get hem completed. Danyelle transferred the call to office clinical to Jocelyn for assistance.

## 2025-05-05 ENCOUNTER — OFFICE VISIT (OUTPATIENT)
Dept: INTERNAL MEDICINE CLINIC | Facility: CLINIC | Age: 70
End: 2025-05-05
Payer: MEDICARE

## 2025-05-05 VITALS
HEART RATE: 61 BPM | SYSTOLIC BLOOD PRESSURE: 124 MMHG | WEIGHT: 155.6 LBS | RESPIRATION RATE: 18 BRPM | TEMPERATURE: 98 F | DIASTOLIC BLOOD PRESSURE: 70 MMHG | OXYGEN SATURATION: 100 % | HEIGHT: 67 IN | BODY MASS INDEX: 24.42 KG/M2

## 2025-05-05 DIAGNOSIS — E78.5 DYSLIPIDEMIA: ICD-10-CM

## 2025-05-05 DIAGNOSIS — M79.605 LEFT LEG PAIN: ICD-10-CM

## 2025-05-05 DIAGNOSIS — Z00.00 MEDICARE ANNUAL WELLNESS VISIT, SUBSEQUENT: Primary | ICD-10-CM

## 2025-05-05 DIAGNOSIS — M81.0 AGE-RELATED OSTEOPOROSIS WITHOUT CURRENT PATHOLOGICAL FRACTURE: ICD-10-CM

## 2025-05-05 PROCEDURE — 99214 OFFICE O/P EST MOD 30 MIN: CPT | Performed by: INTERNAL MEDICINE

## 2025-05-05 PROCEDURE — G0439 PPPS, SUBSEQ VISIT: HCPCS | Performed by: INTERNAL MEDICINE

## 2025-05-05 PROCEDURE — G2211 COMPLEX E/M VISIT ADD ON: HCPCS | Performed by: INTERNAL MEDICINE

## 2025-05-05 NOTE — ASSESSMENT & PLAN NOTE
Prior coronary calcium score of 36.  Continues with atorvastatin 10 mg daily.  LDL has improved.  Possible contribution to left hip/leg pain as outlined below.  See plan for left leg pain below  Orders:  •  CBC and differential; Future  •  Basic metabolic panel; Future  •  Hepatic function panel; Future  •  Lipid Panel with Direct LDL reflex; Future

## 2025-05-05 NOTE — PROGRESS NOTES
Name: Gilda Richard      : 1955      MRN: [id number]  Encounter Provider: Nolan Gonzalez DO  Encounter Date: 2025   Encounter department: [facility]  :  Medical history of osteoporosis, dyslipidemia, left ovarian cyst     Assessment & Plan  Medicare annual wellness visit, subsequent         Dyslipidemia  Prior coronary calcium score of 36.  Continues with atorvastatin 10 mg daily.  LDL has improved.  Possible contribution to left hip/leg pain as outlined below.  See plan for left leg pain below  Orders:  •  CBC and differential; Future  •  Basic metabolic panel; Future  •  Hepatic function panel; Future  •  Lipid Panel with Direct LDL reflex; Future    Age-related osteoporosis without current pathological fracture  -Managed by endocrinology  -Maintained on Reclast  -Scheduled for repeat DEXA scan 2025  -Adequate calcium, vitamin D intake, regular weightbearing exercise is recommended       Left leg pain  History of left hip bursitis.  Had prior injection.  Initial improvement in symptoms but have now worsened.  She has been working with physical therapy which has been of questionable benefit.  More recently she has had onset of pain radiating down the lateral aspect of the left leg.  No inciting trauma or injuries.  No bowel or bladder incontinence.  No numbness, tingling, paresthesias.  She does not have any personal history of malignancy.  On exam, has 5 out of 5 strength in the bilateral lower extremities.  No gross sensory deficits.  Normal reflexes.    She does feel that the discomfort seems to have correlated with her starting on the atorvastatin and wonders if this may be related    Plan:  -After discussion, we will discontinue atorvastatin for approximately 2 weeks.  If she does have resolution or significant improvement in her symptoms, I would suggest statin rechallenge  -It is possible symptoms may be related to lumbar radiculopathy.  Exam  reassuring.  Would hold off on any further imaging at this time.  Continue with activity as tolerated          Preventive health issues were discussed with patient, and age appropriate screening tests were ordered as noted in patient's After Visit Summary. Personalized health advice and appropriate referrals for health education or preventive services given if needed, as noted in patient's After Visit Summary.    History of Present Illness     HPI   Patient Care Team:  Nolan Gonzalez DO as PCP - General (Internal Medicine)  Storm Rouse MD as PCP - PCP-[facility] Attributed-Noemi Stockton MD as PCP - Endocrinology (Endocrinology)    Review of Systems  Medical History Reviewed by provider this encounter:  Meds  Problems       Annual Wellness Visit Questionnaire   Gilda is here for her Subsequent Wellness visit.     Health Risk Assessment:   Patient rates overall health as good. Patient feels that their physical health rating is same. Patient is very satisfied with their life. Eyesight was rated as same. Hearing was rated as slightly worse. Patient feels that their emotional and mental health rating is same. Patients states they are never, rarely angry. Patient states they are never, rarely unusually tired/fatigued. Pain experienced in the last 7 days has been some. Patient's pain rating has been 5/10. Patient states that she has experienced no weight loss or gain in last 6 months.     Depression Screening:   PHQ-2 Score: 0      Fall Risk Screening:   In the past year, patient has experienced: no history of falling in past year      Urinary Incontinence Screening:   Patient has not leaked urine accidently in the last six months.     Home Safety:  Patient does not have trouble with stairs inside or outside of their home. Patient has working smoke alarms and has working carbon monoxide detector. Home safety hazards include: none.     Nutrition:   Current diet is Regular.     Medications:    Patient is currently taking over-the-counter supplements. OTC medications include: see medication list. Patient is able to manage medications.     Activities of Daily Living (ADLs)/Instrumental Activities of Daily Living (IADLs):   Walk and transfer into and out of bed and chair?: Yes  Dress and groom yourself?: Yes    Bathe or shower yourself?: Yes    Feed yourself? Yes  Do your laundry/housekeeping?: Yes  Manage your money, pay your bills and track your expenses?: Yes  Make your own meals?: Yes    Do your own shopping?: Yes    Previous Hospitalizations:   Any hospitalizations or ED visits within the last 12 months?: No      Advance Care Planning:   Living will: Yes    Durable POA for healthcare: No    Advanced directive: Yes      Cognitive Screening:   Provider or family/friend/caregiver concerned regarding cognition?: No    Preventive Screenings      Cardiovascular Screening:    General: Screening Current      Diabetes Screening:     General: Screening Current      Colorectal Cancer Screening:     General: Screening Current      Breast Cancer Screening:     General: Screening Current      Cervical Cancer Screening:    General: Screening Not Indicated      Osteoporosis Screening:    General: Screening Not Indicated and History Osteoporosis      Abdominal Aortic Aneurysm (AAA) Screening:        General: Screening Not Indicated      Lung Cancer Screening:     General: Screening Not Indicated      Hepatitis C Screening:    General: Screening Not Indicated    Screening, Brief Intervention, and Referral to Treatment (SBIRT)     Screening  Typical number of drinks in a day: 0  Typical number of drinks in a week: 7  Interpretation: Low risk drinking behavior.    Single Item Drug Screening:  How often have you used an illegal drug (including marijuana) or a prescription medication for non-medical reasons in the past year? never    Single Item Drug Screen Score: 0  Interpretation: Negative screen for possible drug use  "disorder    Brief Intervention  Alcohol & drug use screenings were reviewed. No concerns regarding substance use disorder identified.     Social Drivers of Health     Financial Resource Strain: Low Risk  (9/26/2023)    [facility]    Overall Financial Resource Strain (CARDIA)    • Difficulty of Paying Living Expenses: Not hard at all   Food Insecurity: No Food Insecurity (5/5/2025)    Hunger Vital Sign    • Worried About Running Out of Food in the Last Year: Never true    • Ran Out of Food in the Last Year: Never true   Transportation Needs: No Transportation Needs (5/5/2025)    PRAPARE - Transportation    • Lack of Transportation (Medical): No    • Lack of Transportation (Non-Medical): No   Housing Stability: Unknown (5/5/2025)    Housing Stability Vital Sign    • Unable to Pay for Housing in the Last Year: No    • Homeless in the Last Year: No   Utilities: Not At Risk (5/5/2025)    [facility] Utilities    • Threatened with loss of utilities: No     No results found.    Objective   /70 (BP Location: Left arm, Patient Position: Sitting, Cuff Size: Standard)   Pulse 61   Temp 98 °F (36.7 °C) (Temporal)   Resp 18   Ht 5' 7\" (1.702 m)   Wt 70.6 kg (155 lb 9.6 oz)   SpO2 100%   BMI 24.37 kg/m²     Physical Exam  HENT:      Right Ear: Tympanic membrane normal. There is no impacted cerumen.      Left Ear: Tympanic membrane normal. There is no impacted cerumen.      Mouth/Throat:      Mouth: Mucous membranes are moist.      Pharynx: No oropharyngeal exudate.   Eyes:      General: No scleral icterus.        Right eye: No discharge.         Left eye: No discharge.      Extraocular Movements: Extraocular movements intact.      Pupils: Pupils are equal, round, and reactive to light.   Cardiovascular:      Rate and Rhythm: Normal rate and regular rhythm.      Heart sounds: Normal heart sounds. No murmur heard.  Pulmonary:      Effort: Pulmonary effort is normal. "      Breath sounds: Normal breath sounds. No wheezing, rhonchi or rales.   Neurological:      Sensory: No sensory deficit.      Motor: No weakness.      Deep Tendon Reflexes: Reflexes normal.

## 2025-05-05 NOTE — ASSESSMENT & PLAN NOTE
-Managed by endocrinology  -Maintained on Reclast  -Scheduled for repeat DEXA scan July 2025  -Adequate calcium, vitamin D intake, regular weightbearing exercise is recommended

## 2025-05-08 ENCOUNTER — OFFICE VISIT (OUTPATIENT)
Dept: PHYSICAL THERAPY | Facility: REHABILITATION | Age: 70
End: 2025-05-08
Attending: ORTHOPAEDIC SURGERY
Payer: MEDICARE

## 2025-05-08 DIAGNOSIS — M70.62 TROCHANTERIC BURSITIS OF LEFT HIP: Primary | ICD-10-CM

## 2025-05-08 PROCEDURE — 97110 THERAPEUTIC EXERCISES: CPT

## 2025-05-08 PROCEDURE — 97112 NEUROMUSCULAR REEDUCATION: CPT

## 2025-05-08 NOTE — PROGRESS NOTES
"Daily Note     Today's date: 2025  Patient name: Gilda Richard  : 1955  MRN: [id number]  Referring provider: Jose Guadalupe Schwartz, *  Dx:   Encounter Diagnosis     ICD-10-CM    1. Trochanteric bursitis of left hip  M70.62           Start Time: 1200  Stop Time: 1245  Total time in clinic (min): 45 minutes    Subjective: Glida has no new complaints today. Denies any L hip pain today or issues after last PT session.        Objective: See treatment diary below      Assessment: Gilda Tolerated treatment well with appropriate muscle fatigue experienced with ex's. She is making steady gains towards goals  and remains appropriately challenged with their program. Tolerated increased resistance and increased reps with ex's well today w/o any L hip discomfort reported. Required vc's t/o session for proper positioning with ex's.  She would benefit from cont compliance with HEP.  Reassess NV.       Plan: Continue per plan of care.      Precautions: none      Manuals                                                               Neuro Re-Ed             SLS with HHA issued 3x30\" gerardo (R with rotations) 30\"x3 Airex           sidestepping  Red 3 laps  GRN 3 laps          Monster walks  Red 3 laps GRN 3 laps           Modified side plank  3x15\"  30\"x2 L side                                                 Ther Ex             Upright bike  6 min L3 6min           Sidelying hip abduction issued 5\" 10x L 5\" 2x10 L           Prone hip extension issued 5\" 10x L 5\" 2x10 L           Hip ER band Red issued Red 5\" 15x GRN 5\" x20                                                               Ther Activity                                       Gait Training                                       Modalities             Ice PRN                                 "

## 2025-05-16 DIAGNOSIS — E78.5 DYSLIPIDEMIA: Primary | ICD-10-CM

## 2025-05-16 RX ORDER — ROSUVASTATIN CALCIUM 5 MG/1
5 TABLET, COATED ORAL DAILY
Qty: 90 TABLET | Refills: 0 | Status: SHIPPED | OUTPATIENT
Start: 2025-05-16

## 2025-05-19 ENCOUNTER — TELEPHONE (OUTPATIENT)
Age: 70
End: 2025-05-19

## 2025-05-19 NOTE — TELEPHONE ENCOUNTER
Patient called and stated she is in need of a pneumonia vaccine. Patient is asking if this order can be placed and outreach can be made to her to get schedule as soon as possible. Patient stated her  had received one when he was in the office and patient would like to do the same. Please advise.

## 2025-05-22 ENCOUNTER — EVALUATION (OUTPATIENT)
Dept: PHYSICAL THERAPY | Facility: REHABILITATION | Age: 70
End: 2025-05-22
Attending: ORTHOPAEDIC SURGERY
Payer: MEDICARE

## 2025-05-22 DIAGNOSIS — M70.62 TROCHANTERIC BURSITIS OF LEFT HIP: Primary | ICD-10-CM

## 2025-05-22 PROCEDURE — 97110 THERAPEUTIC EXERCISES: CPT | Performed by: PHYSICAL THERAPIST

## 2025-05-22 PROCEDURE — 97140 MANUAL THERAPY 1/> REGIONS: CPT | Performed by: PHYSICAL THERAPIST

## 2025-05-22 NOTE — PROGRESS NOTES
PT Discharge    Today's date: 2025  Patient name: Gilda Richard  : 1955  MRN: [id number]  Referring provider: Jose Guadalupe Schwartz, *  Dx:   Encounter Diagnosis     ICD-10-CM    1. Trochanteric bursitis of left hip  M70.62           Start Time: 1112  Stop Time: 1144  Total time in clinic (min): 32 minutes    Assessment  Impairments: activity intolerance and impaired physical strength    Assessment details: Patient is a 69 y.o. female that presents with left lateral hip pain.  Patient reports 80% improvement with skilled physical therapy services.  Patient reports improvement with recreational activities, sit to stand, prolonged walking, and negotiation of stairs.  Patient reports continued difficulty with negotiation of stairs (step to step at times).  Patient has made good progress towards goals established for physical therapy.  Patient would benefit from HEP for continued strengthening.  Discharged to [facility] at this time.           Understanding of Dx/Px/POC: good    Goals  Impairment:  1.  Patient will reports 50% reduction in pain in 4 weeks to maximize function.-MET  2.  Patient will improve strength to 4/5 in all planes to maximize function.-MET  3.  Patient will improve ROM to WFL in 4 weeks to maximize function.-MET    Functional:  1. Patient will be independent with HEP in 4 weeks to maximize function.-MET  2. Patient will report no difficulty with prolonged walking in 4 weeks to maximize function.-MET  3. Patient will report no difficulty with sit to  4 weeks to maximize function.-MET  4. Patient will report no difficulty with recreational activities in 4 weeks to maximize function.-MET          Plan  Patient would benefit from: skilled physical therapy  Planned modality interventions: cryotherapy    Planned therapy interventions: abdominal trunk stabilization, activity modification, neuromuscular re-education, manual therapy, patient/caregiver education, therapeutic exercise,  strengthening, home exercise program, functional ROM exercises, therapeutic activities and balance    Frequency: 1x week  Duration in weeks: 4  Treatment plan discussed with: patient  Plan details: Patient will be a RE in 4 weeks.          Subjective Evaluation    History of Present Illness  Mechanism of injury: Patient presents with left lateral hip discomfort over the last year.  She had an injection in 2024 with about 3 months of relief.  Patient reports symptoms have been worsening over the last few months.  She is an active person including running, piliates, cycling, etc.  She continues to be active despite the pain.  Patient reports her symptoms can radiate down the lateral aspect of her leg just past her knee if severe.  Her symptoms are worst after walking for several miles.  Patient will walk up to 5 miles.  Patient reports difficulty with recreational activities, sit to stand, prolonged walking, and negotiation of stairs.  Patient Goals  Patient goals for therapy: decreased pain    Pain  At best pain ratin  At worst pain ratin  Location: L lateral hip  Quality: sharp and dull ache  Aggravating factors: running, stair climbing, walking and standing  Progression: worsening      Diagnostic Tests  X-ray: normal  Treatments  Previous treatment: injection treatment  Current treatment: physical therapy        Objective     Passive Range of Motion   Left Hip   Flexion: WFL and with pain  External rotation (90/90): WFL and with pain  Internal rotation (90/90): WFL and with pain    Additional Passive Range of Motion Details  Mild discomfort with hip PROM in all directions    Strength/Myotome Testing     Left Hip   Planes of Motion   Flexion: 4+  Extension: 4 (pain)  Abduction: 4  External rotation: 4-  Internal rotation: 4+    Right Hip   Planes of Motion   Flexion: 4+  Extension: 4  Abduction: 4  External rotation: 4  Internal rotation: 4+    Left Knee   Flexion: 4+  Extension: 4+    Right Knee  "  Flexion: 4+  Extension: 4+    Functional Assessment        Single Leg Stance   Left: 30 seconds  Right: 30 seconds             Precautions: none        Manuals 4/24 5/1 5/8 5/22               measurements        20 min                                                                                       Neuro Re-Ed                       SLS with HHA issued 3x30\" gerardo (R with rotations) 30\"x3 Airex                  sidestepping   Red 3 laps  GRN 3 laps                 Monster walks   Red 3 laps GRN 3 laps                  Modified side plank   3x15\"  30\"x2 L side                                                                                         Ther Ex                       Upright bike   6 min L3 6 min   L3 7 min               Sidelying hip abduction issued 5\" 10x L 5\" 2x10 L                  Prone hip extension issued 5\" 10x L 5\" 2x10 L                  Hip ER band Red issued Red 5\" 15x GRN 5\" x20                   HEP review        5 min                                                                                       Ther Activity                                                                       Gait Training                                                                       Modalities                       Ice PRN                                                       "

## 2025-07-27 DIAGNOSIS — E78.5 DYSLIPIDEMIA: ICD-10-CM

## 2025-07-30 RX ORDER — ROSUVASTATIN CALCIUM 5 MG/1
5 TABLET, COATED ORAL DAILY
Qty: 90 TABLET | Refills: 0 | Status: SHIPPED | OUTPATIENT
Start: 2025-07-30